# Patient Record
Sex: MALE | Race: BLACK OR AFRICAN AMERICAN | NOT HISPANIC OR LATINO
[De-identification: names, ages, dates, MRNs, and addresses within clinical notes are randomized per-mention and may not be internally consistent; named-entity substitution may affect disease eponyms.]

---

## 2017-04-06 ENCOUNTER — RECORDS - HEALTHEAST (OUTPATIENT)
Dept: ADMINISTRATIVE | Facility: OTHER | Age: 42
End: 2017-04-06

## 2017-10-17 ENCOUNTER — OFFICE VISIT - HEALTHEAST (OUTPATIENT)
Dept: INTERNAL MEDICINE | Facility: CLINIC | Age: 42
End: 2017-10-17

## 2017-10-17 ENCOUNTER — COMMUNICATION - HEALTHEAST (OUTPATIENT)
Dept: INTERNAL MEDICINE | Facility: CLINIC | Age: 42
End: 2017-10-17

## 2017-10-17 DIAGNOSIS — F25.0 SCHIZOAFFECTIVE DISORDER, BIPOLAR TYPE (H): ICD-10-CM

## 2017-10-17 DIAGNOSIS — M54.50 BILATERAL LOW BACK PAIN WITHOUT SCIATICA: ICD-10-CM

## 2017-10-17 DIAGNOSIS — F19.90 SUBSTANCE USE DISORDER: ICD-10-CM

## 2017-10-17 DIAGNOSIS — F60.9 PERSONALITY DISORDER (H): ICD-10-CM

## 2017-10-17 ASSESSMENT — MIFFLIN-ST. JEOR: SCORE: 1605.21

## 2017-10-27 ENCOUNTER — COMMUNICATION - HEALTHEAST (OUTPATIENT)
Dept: INTERNAL MEDICINE | Facility: CLINIC | Age: 42
End: 2017-10-27

## 2017-10-27 DIAGNOSIS — F25.1 SCHIZOAFFECTIVE DISORDER, DEPRESSIVE TYPE (H): ICD-10-CM

## 2017-11-03 ENCOUNTER — COMMUNICATION - HEALTHEAST (OUTPATIENT)
Dept: PHYSICAL MEDICINE AND REHAB | Facility: CLINIC | Age: 42
End: 2017-11-03

## 2017-11-16 ENCOUNTER — OFFICE VISIT - HEALTHEAST (OUTPATIENT)
Dept: INTERNAL MEDICINE | Facility: CLINIC | Age: 42
End: 2017-11-16

## 2017-11-16 DIAGNOSIS — F25.0 SCHIZOAFFECTIVE DISORDER, BIPOLAR TYPE (H): ICD-10-CM

## 2017-11-16 DIAGNOSIS — F19.20 POLYSUBSTANCE (EXCLUDING OPIOIDS) DEPENDENCE (H): ICD-10-CM

## 2017-11-16 DIAGNOSIS — M54.50 ACUTE BILATERAL LOW BACK PAIN WITHOUT SCIATICA: ICD-10-CM

## 2017-11-16 DIAGNOSIS — F19.90 SUBSTANCE USE DISORDER: ICD-10-CM

## 2017-11-16 DIAGNOSIS — G89.29 CHRONIC BACK PAIN: ICD-10-CM

## 2017-11-16 DIAGNOSIS — F99 CHRONIC MENTAL ILLNESS: ICD-10-CM

## 2017-11-16 DIAGNOSIS — M54.9 CHRONIC BACK PAIN: ICD-10-CM

## 2017-11-16 DIAGNOSIS — M25.511 RIGHT SHOULDER PAIN: ICD-10-CM

## 2017-11-16 ASSESSMENT — MIFFLIN-ST. JEOR: SCORE: 1582.53

## 2017-12-01 ENCOUNTER — COMMUNICATION - HEALTHEAST (OUTPATIENT)
Dept: INTERNAL MEDICINE | Facility: CLINIC | Age: 42
End: 2017-12-01

## 2017-12-14 ENCOUNTER — COMMUNICATION - HEALTHEAST (OUTPATIENT)
Dept: INTERNAL MEDICINE | Facility: CLINIC | Age: 42
End: 2017-12-14

## 2018-01-04 ENCOUNTER — COMMUNICATION - HEALTHEAST (OUTPATIENT)
Dept: INTERNAL MEDICINE | Facility: CLINIC | Age: 43
End: 2018-01-04

## 2018-01-05 ENCOUNTER — COMMUNICATION - HEALTHEAST (OUTPATIENT)
Dept: SCHEDULING | Facility: CLINIC | Age: 43
End: 2018-01-05

## 2018-01-30 ENCOUNTER — COMMUNICATION - HEALTHEAST (OUTPATIENT)
Dept: INTERNAL MEDICINE | Facility: CLINIC | Age: 43
End: 2018-01-30

## 2018-01-30 DIAGNOSIS — M54.50 BILATERAL LOW BACK PAIN WITHOUT SCIATICA: ICD-10-CM

## 2018-02-09 ENCOUNTER — COMMUNICATION - HEALTHEAST (OUTPATIENT)
Dept: INTERNAL MEDICINE | Facility: CLINIC | Age: 43
End: 2018-02-09

## 2018-03-26 ENCOUNTER — OFFICE VISIT - HEALTHEAST (OUTPATIENT)
Dept: INTERNAL MEDICINE | Facility: CLINIC | Age: 43
End: 2018-03-26

## 2018-03-26 DIAGNOSIS — F25.1 SCHIZOAFFECTIVE DISORDER, DEPRESSIVE TYPE (H): ICD-10-CM

## 2018-03-26 DIAGNOSIS — G89.29 CHRONIC BILATERAL LOW BACK PAIN WITHOUT SCIATICA: ICD-10-CM

## 2018-03-26 DIAGNOSIS — F41.9 ANXIETY: ICD-10-CM

## 2018-03-26 DIAGNOSIS — M54.50 CHRONIC BILATERAL LOW BACK PAIN WITHOUT SCIATICA: ICD-10-CM

## 2018-03-26 DIAGNOSIS — H40.9 GLAUCOMA: ICD-10-CM

## 2018-03-26 DIAGNOSIS — Z20.2 STD EXPOSURE: ICD-10-CM

## 2018-03-26 LAB — HIV 1+2 AB+HIV1 P24 AG SERPL QL IA: NEGATIVE

## 2018-03-26 ASSESSMENT — MIFFLIN-ST. JEOR: SCORE: 1609.74

## 2018-03-27 LAB
C TRACH DNA SPEC QL PROBE+SIG AMP: NEGATIVE
N GONORRHOEA DNA SPEC QL NAA+PROBE: NEGATIVE
T PALLIDUM AB SER QL: NEGATIVE

## 2018-03-28 ENCOUNTER — COMMUNICATION - HEALTHEAST (OUTPATIENT)
Dept: INTERNAL MEDICINE | Facility: CLINIC | Age: 43
End: 2018-03-28

## 2018-04-06 ENCOUNTER — COMMUNICATION - HEALTHEAST (OUTPATIENT)
Dept: INTERNAL MEDICINE | Facility: CLINIC | Age: 43
End: 2018-04-06

## 2018-04-10 ENCOUNTER — RECORDS - HEALTHEAST (OUTPATIENT)
Dept: ADMINISTRATIVE | Facility: OTHER | Age: 43
End: 2018-04-10

## 2018-04-26 ENCOUNTER — OFFICE VISIT - HEALTHEAST (OUTPATIENT)
Dept: INTERNAL MEDICINE | Facility: CLINIC | Age: 43
End: 2018-04-26

## 2018-04-26 DIAGNOSIS — F41.9 ANXIETY: ICD-10-CM

## 2018-04-26 DIAGNOSIS — G89.29 CHRONIC BILATERAL LOW BACK PAIN WITHOUT SCIATICA: ICD-10-CM

## 2018-04-26 DIAGNOSIS — F19.90 SUBSTANCE USE DISORDER: ICD-10-CM

## 2018-04-26 DIAGNOSIS — F25.1 SCHIZOAFFECTIVE DISORDER, DEPRESSIVE TYPE (H): ICD-10-CM

## 2018-04-26 DIAGNOSIS — M54.50 CHRONIC BILATERAL LOW BACK PAIN WITHOUT SCIATICA: ICD-10-CM

## 2018-04-26 ASSESSMENT — MIFFLIN-ST. JEOR: SCORE: 1682.32

## 2018-05-30 ENCOUNTER — OFFICE VISIT - HEALTHEAST (OUTPATIENT)
Dept: INTERNAL MEDICINE | Facility: CLINIC | Age: 43
End: 2018-05-30

## 2018-05-30 DIAGNOSIS — M54.50 BILATERAL LOW BACK PAIN WITHOUT SCIATICA: ICD-10-CM

## 2018-05-30 DIAGNOSIS — G89.29 CHRONIC BILATERAL LOW BACK PAIN WITHOUT SCIATICA: ICD-10-CM

## 2018-05-30 DIAGNOSIS — M54.50 CHRONIC BILATERAL LOW BACK PAIN WITHOUT SCIATICA: ICD-10-CM

## 2018-05-30 DIAGNOSIS — F60.9 PERSONALITY DISORDER (H): ICD-10-CM

## 2018-05-30 DIAGNOSIS — F25.1 SCHIZOAFFECTIVE DISORDER, DEPRESSIVE TYPE (H): ICD-10-CM

## 2018-05-30 ASSESSMENT — MIFFLIN-ST. JEOR: SCORE: 1655.1

## 2018-06-06 ENCOUNTER — RECORDS - HEALTHEAST (OUTPATIENT)
Dept: ADMINISTRATIVE | Facility: OTHER | Age: 43
End: 2018-06-06

## 2018-06-08 ENCOUNTER — COMMUNICATION - HEALTHEAST (OUTPATIENT)
Dept: INTERNAL MEDICINE | Facility: CLINIC | Age: 43
End: 2018-06-08

## 2018-06-20 ENCOUNTER — COMMUNICATION - HEALTHEAST (OUTPATIENT)
Dept: INTERNAL MEDICINE | Facility: CLINIC | Age: 43
End: 2018-06-20

## 2018-06-25 ENCOUNTER — COMMUNICATION - HEALTHEAST (OUTPATIENT)
Dept: INTERNAL MEDICINE | Facility: CLINIC | Age: 43
End: 2018-06-25

## 2018-06-26 ENCOUNTER — AMBULATORY - HEALTHEAST (OUTPATIENT)
Dept: INTERNAL MEDICINE | Facility: CLINIC | Age: 43
End: 2018-06-26

## 2018-06-27 ENCOUNTER — OFFICE VISIT - HEALTHEAST (OUTPATIENT)
Dept: INTERNAL MEDICINE | Facility: CLINIC | Age: 43
End: 2018-06-27

## 2018-06-27 ENCOUNTER — RECORDS - HEALTHEAST (OUTPATIENT)
Dept: ADMINISTRATIVE | Facility: OTHER | Age: 43
End: 2018-06-27

## 2018-06-27 DIAGNOSIS — G89.29 CHRONIC BACK PAIN: ICD-10-CM

## 2018-06-27 DIAGNOSIS — F25.1 SCHIZOAFFECTIVE DISORDER, DEPRESSIVE TYPE (H): ICD-10-CM

## 2018-06-27 DIAGNOSIS — F19.90 SUBSTANCE USE DISORDER: ICD-10-CM

## 2018-06-27 DIAGNOSIS — F41.9 ANXIETY: ICD-10-CM

## 2018-06-27 DIAGNOSIS — Z00.00 ROUTINE GENERAL MEDICAL EXAMINATION AT A HEALTH CARE FACILITY: ICD-10-CM

## 2018-06-27 DIAGNOSIS — Z20.2 POTENTIAL EXPOSURE TO STD: ICD-10-CM

## 2018-06-27 DIAGNOSIS — N48.89 PENILE CYST: ICD-10-CM

## 2018-06-27 DIAGNOSIS — M54.9 CHRONIC BACK PAIN: ICD-10-CM

## 2018-06-27 LAB
ALBUMIN SERPL-MCNC: 3.7 G/DL (ref 3.5–5)
ALBUMIN UR-MCNC: NEGATIVE MG/DL
ALP SERPL-CCNC: 36 U/L (ref 45–120)
ALT SERPL W P-5'-P-CCNC: 16 U/L (ref 0–45)
ANION GAP SERPL CALCULATED.3IONS-SCNC: 7 MMOL/L (ref 5–18)
APPEARANCE UR: CLEAR
AST SERPL W P-5'-P-CCNC: 16 U/L (ref 0–40)
BILIRUB DIRECT SERPL-MCNC: 0.1 MG/DL
BILIRUB SERPL-MCNC: 0.3 MG/DL (ref 0–1)
BILIRUB UR QL STRIP: NEGATIVE
BUN SERPL-MCNC: 9 MG/DL (ref 8–22)
CALCIUM SERPL-MCNC: 9.2 MG/DL (ref 8.5–10.5)
CHLORIDE BLD-SCNC: 109 MMOL/L (ref 98–107)
CHOLEST SERPL-MCNC: 163 MG/DL
CO2 SERPL-SCNC: 28 MMOL/L (ref 22–31)
COLOR UR AUTO: YELLOW
CREAT SERPL-MCNC: 0.83 MG/DL (ref 0.7–1.3)
ERYTHROCYTE [DISTWIDTH] IN BLOOD BY AUTOMATED COUNT: 11.5 % (ref 11–14.5)
FASTING STATUS PATIENT QL REPORTED: YES
GFR SERPL CREATININE-BSD FRML MDRD: >60 ML/MIN/1.73M2
GLUCOSE BLD-MCNC: 64 MG/DL (ref 70–125)
GLUCOSE UR STRIP-MCNC: NEGATIVE MG/DL
HCT VFR BLD AUTO: 42 % (ref 40–54)
HDLC SERPL-MCNC: 50 MG/DL
HGB BLD-MCNC: 13.8 G/DL (ref 14–18)
HGB UR QL STRIP: NEGATIVE
HIV 1+2 AB+HIV1 P24 AG SERPL QL IA: NEGATIVE
KETONES UR STRIP-MCNC: NEGATIVE MG/DL
LDLC SERPL CALC-MCNC: 97 MG/DL
LEUKOCYTE ESTERASE UR QL STRIP: NEGATIVE
MCH RBC QN AUTO: 31.6 PG (ref 27–34)
MCHC RBC AUTO-ENTMCNC: 32.9 G/DL (ref 32–36)
MCV RBC AUTO: 96 FL (ref 80–100)
NITRATE UR QL: NEGATIVE
PH UR STRIP: 6 [PH] (ref 5–8)
PLATELET # BLD AUTO: 337 THOU/UL (ref 140–440)
PMV BLD AUTO: 6.3 FL (ref 7–10)
POTASSIUM BLD-SCNC: 3.8 MMOL/L (ref 3.5–5)
PROT SERPL-MCNC: 6.6 G/DL (ref 6–8)
RBC # BLD AUTO: 4.38 MILL/UL (ref 4.4–6.2)
SODIUM SERPL-SCNC: 144 MMOL/L (ref 136–145)
SP GR UR STRIP: 1.02 (ref 1–1.03)
TRIGL SERPL-MCNC: 82 MG/DL
UROBILINOGEN UR STRIP-ACNC: NORMAL
WBC: 4.8 THOU/UL (ref 4–11)

## 2018-06-27 ASSESSMENT — MIFFLIN-ST. JEOR: SCORE: 1683.45

## 2018-07-02 ENCOUNTER — COMMUNICATION - HEALTHEAST (OUTPATIENT)
Dept: INTERNAL MEDICINE | Facility: CLINIC | Age: 43
End: 2018-07-02

## 2018-07-03 ENCOUNTER — COMMUNICATION - HEALTHEAST (OUTPATIENT)
Dept: INTERNAL MEDICINE | Facility: CLINIC | Age: 43
End: 2018-07-03

## 2018-07-11 ENCOUNTER — COMMUNICATION - HEALTHEAST (OUTPATIENT)
Dept: INTERNAL MEDICINE | Facility: CLINIC | Age: 43
End: 2018-07-11

## 2018-07-12 ENCOUNTER — RECORDS - HEALTHEAST (OUTPATIENT)
Dept: ADMINISTRATIVE | Facility: OTHER | Age: 43
End: 2018-07-12

## 2018-07-20 ENCOUNTER — RECORDS - HEALTHEAST (OUTPATIENT)
Dept: ADMINISTRATIVE | Facility: OTHER | Age: 43
End: 2018-07-20

## 2018-07-21 ENCOUNTER — RECORDS - HEALTHEAST (OUTPATIENT)
Dept: ADMINISTRATIVE | Facility: OTHER | Age: 43
End: 2018-07-21

## 2018-07-21 ENCOUNTER — COMMUNICATION - HEALTHEAST (OUTPATIENT)
Dept: SCHEDULING | Facility: CLINIC | Age: 43
End: 2018-07-21

## 2018-07-23 ENCOUNTER — COMMUNICATION - HEALTHEAST (OUTPATIENT)
Dept: BEHAVIORAL HEALTH | Facility: CLINIC | Age: 43
End: 2018-07-23

## 2018-07-24 ENCOUNTER — COMMUNICATION - HEALTHEAST (OUTPATIENT)
Dept: INTERNAL MEDICINE | Facility: CLINIC | Age: 43
End: 2018-07-24

## 2018-07-26 ENCOUNTER — OFFICE VISIT - HEALTHEAST (OUTPATIENT)
Dept: INTERNAL MEDICINE | Facility: CLINIC | Age: 43
End: 2018-07-26

## 2018-07-26 ENCOUNTER — COMMUNICATION - HEALTHEAST (OUTPATIENT)
Dept: INTERNAL MEDICINE | Facility: CLINIC | Age: 43
End: 2018-07-26

## 2018-07-26 DIAGNOSIS — F25.1 SCHIZOAFFECTIVE DISORDER, DEPRESSIVE TYPE (H): ICD-10-CM

## 2018-07-26 DIAGNOSIS — S09.93XS DENTAL INJURY, SEQUELA: ICD-10-CM

## 2018-07-26 DIAGNOSIS — K08.89 PAIN, DENTAL: ICD-10-CM

## 2018-07-26 ASSESSMENT — MIFFLIN-ST. JEOR: SCORE: 1677.78

## 2018-07-30 ENCOUNTER — COMMUNICATION - HEALTHEAST (OUTPATIENT)
Dept: INTERNAL MEDICINE | Facility: CLINIC | Age: 43
End: 2018-07-30

## 2018-08-01 ENCOUNTER — COMMUNICATION - HEALTHEAST (OUTPATIENT)
Dept: INTERNAL MEDICINE | Facility: CLINIC | Age: 43
End: 2018-08-01

## 2018-08-28 ENCOUNTER — COMMUNICATION - HEALTHEAST (OUTPATIENT)
Dept: INTERNAL MEDICINE | Facility: CLINIC | Age: 43
End: 2018-08-28

## 2018-08-28 DIAGNOSIS — F25.1 SCHIZOAFFECTIVE DISORDER, DEPRESSIVE TYPE (H): ICD-10-CM

## 2018-09-24 ENCOUNTER — RECORDS - HEALTHEAST (OUTPATIENT)
Dept: ADMINISTRATIVE | Facility: OTHER | Age: 43
End: 2018-09-24

## 2018-11-23 ENCOUNTER — RECORDS - HEALTHEAST (OUTPATIENT)
Dept: ADMINISTRATIVE | Facility: OTHER | Age: 43
End: 2018-11-23

## 2019-01-09 ENCOUNTER — RECORDS - HEALTHEAST (OUTPATIENT)
Dept: GENERAL RADIOLOGY | Facility: CLINIC | Age: 44
End: 2019-01-09

## 2019-01-09 ENCOUNTER — OFFICE VISIT - HEALTHEAST (OUTPATIENT)
Dept: INTERNAL MEDICINE | Facility: CLINIC | Age: 44
End: 2019-01-09

## 2019-01-09 DIAGNOSIS — F25.1 SCHIZOAFFECTIVE DISORDER, DEPRESSIVE TYPE (H): ICD-10-CM

## 2019-01-09 DIAGNOSIS — M79.645 THUMB PAIN, LEFT: ICD-10-CM
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
## 2019-01-09 DIAGNOSIS — M79.644 PAIN IN RIGHT FINGER(S): ICD-10-CM
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
## 2019-01-09 DIAGNOSIS — F41.9 ANXIETY: ICD-10-CM

## 2019-01-09 RX ORDER — RISPERIDONE 3 MG/1
3 TABLET ORAL AT BEDTIME
Qty: 30 TABLET | Refills: 1 | Status: SHIPPED | OUTPATIENT
Start: 2019-01-09

## 2019-01-09 ASSESSMENT — MIFFLIN-ST. JEOR: SCORE: 1627.89

## 2019-01-11 ENCOUNTER — RECORDS - HEALTHEAST (OUTPATIENT)
Dept: ADMINISTRATIVE | Facility: OTHER | Age: 44
End: 2019-01-11

## 2019-01-29 ENCOUNTER — RECORDS - HEALTHEAST (OUTPATIENT)
Dept: ADMINISTRATIVE | Facility: OTHER | Age: 44
End: 2019-01-29

## 2019-01-29 ENCOUNTER — OFFICE VISIT - HEALTHEAST (OUTPATIENT)
Dept: INTERNAL MEDICINE | Facility: CLINIC | Age: 44
End: 2019-01-29

## 2019-01-29 DIAGNOSIS — M79.645 THUMB PAIN, LEFT: ICD-10-CM

## 2019-01-29 ASSESSMENT — MIFFLIN-ST. JEOR: SCORE: 1609.74

## 2019-03-05 ENCOUNTER — RECORDS - HEALTHEAST (OUTPATIENT)
Dept: ADMINISTRATIVE | Facility: OTHER | Age: 44
End: 2019-03-05

## 2019-03-15 ENCOUNTER — OFFICE VISIT - HEALTHEAST (OUTPATIENT)
Dept: INTERNAL MEDICINE | Facility: CLINIC | Age: 44
End: 2019-03-15

## 2019-03-15 DIAGNOSIS — F60.9 PERSONALITY DISORDER (H): ICD-10-CM

## 2019-03-15 DIAGNOSIS — M79.645 PAIN OF FINGER OF LEFT HAND: ICD-10-CM

## 2019-03-15 DIAGNOSIS — G47.01 INSOMNIA DUE TO MEDICAL CONDITION: ICD-10-CM

## 2019-03-15 DIAGNOSIS — E55.9 VITAMIN D DEFICIENCY: ICD-10-CM

## 2019-03-15 ASSESSMENT — MIFFLIN-ST. JEOR: SCORE: 1618.81

## 2019-03-18 ENCOUNTER — RECORDS - HEALTHEAST (OUTPATIENT)
Dept: ADMINISTRATIVE | Facility: OTHER | Age: 44
End: 2019-03-18

## 2019-03-20 ENCOUNTER — AMBULATORY - HEALTHEAST (OUTPATIENT)
Dept: INTERNAL MEDICINE | Facility: CLINIC | Age: 44
End: 2019-03-20

## 2019-03-20 ENCOUNTER — COMMUNICATION - HEALTHEAST (OUTPATIENT)
Dept: INTERNAL MEDICINE | Facility: CLINIC | Age: 44
End: 2019-03-20

## 2019-03-20 DIAGNOSIS — M25.539 WRIST PAIN: ICD-10-CM

## 2019-03-20 DIAGNOSIS — M54.6 THORACIC BACK PAIN: ICD-10-CM

## 2019-03-20 DIAGNOSIS — M54.9 CHRONIC BACK PAIN: ICD-10-CM

## 2019-03-20 DIAGNOSIS — G89.29 CHRONIC BACK PAIN: ICD-10-CM

## 2019-04-20 ENCOUNTER — COMMUNICATION - HEALTHEAST (OUTPATIENT)
Dept: INTERNAL MEDICINE | Facility: CLINIC | Age: 44
End: 2019-04-20

## 2019-04-20 DIAGNOSIS — G47.01 INSOMNIA DUE TO MEDICAL CONDITION: ICD-10-CM

## 2019-07-15 ENCOUNTER — COMMUNICATION - HEALTHEAST (OUTPATIENT)
Dept: INTERNAL MEDICINE | Facility: CLINIC | Age: 44
End: 2019-07-15

## 2019-07-18 ENCOUNTER — COMMUNICATION - HEALTHEAST (OUTPATIENT)
Dept: SCHEDULING | Facility: CLINIC | Age: 44
End: 2019-07-18

## 2019-07-23 ENCOUNTER — OFFICE VISIT - HEALTHEAST (OUTPATIENT)
Dept: INTERNAL MEDICINE | Facility: CLINIC | Age: 44
End: 2019-07-23

## 2019-07-23 DIAGNOSIS — S31.30XA OPEN WOUND OF SCROTUM, INITIAL ENCOUNTER: ICD-10-CM

## 2019-07-23 DIAGNOSIS — F25.1 SCHIZOAFFECTIVE DISORDER, DEPRESSIVE TYPE (H): ICD-10-CM

## 2019-07-23 ASSESSMENT — MIFFLIN-ST. JEOR: SCORE: 1591.6

## 2019-07-24 ENCOUNTER — COMMUNICATION - HEALTHEAST (OUTPATIENT)
Dept: INTERNAL MEDICINE | Facility: CLINIC | Age: 44
End: 2019-07-24

## 2019-08-19 ENCOUNTER — RECORDS - HEALTHEAST (OUTPATIENT)
Dept: ADMINISTRATIVE | Facility: OTHER | Age: 44
End: 2019-08-19

## 2019-08-19 ENCOUNTER — AMBULATORY - HEALTHEAST (OUTPATIENT)
Dept: INTERNAL MEDICINE | Facility: CLINIC | Age: 44
End: 2019-08-19

## 2019-08-19 ENCOUNTER — COMMUNICATION - HEALTHEAST (OUTPATIENT)
Dept: INTERNAL MEDICINE | Facility: CLINIC | Age: 44
End: 2019-08-19

## 2019-08-19 DIAGNOSIS — K43.9 VENTRAL HERNIA: ICD-10-CM

## 2019-08-22 ENCOUNTER — COMMUNICATION - HEALTHEAST (OUTPATIENT)
Dept: INTERNAL MEDICINE | Facility: CLINIC | Age: 44
End: 2019-08-22

## 2019-08-22 ENCOUNTER — OFFICE VISIT - HEALTHEAST (OUTPATIENT)
Dept: SURGERY | Facility: CLINIC | Age: 44
End: 2019-08-22

## 2019-08-22 ENCOUNTER — SURGERY - HEALTHEAST (OUTPATIENT)
Dept: SURGERY | Facility: CLINIC | Age: 44
End: 2019-08-22

## 2019-08-22 DIAGNOSIS — K43.9 VENTRAL HERNIA WITHOUT OBSTRUCTION OR GANGRENE: ICD-10-CM

## 2019-08-22 ASSESSMENT — MIFFLIN-ST. JEOR: SCORE: 1596.13

## 2019-08-23 ENCOUNTER — RECORDS - HEALTHEAST (OUTPATIENT)
Dept: ADMINISTRATIVE | Facility: OTHER | Age: 44
End: 2019-08-23

## 2019-08-27 ENCOUNTER — OFFICE VISIT - HEALTHEAST (OUTPATIENT)
Dept: INTERNAL MEDICINE | Facility: CLINIC | Age: 44
End: 2019-08-27

## 2019-08-27 ENCOUNTER — COMMUNICATION - HEALTHEAST (OUTPATIENT)
Dept: SCHEDULING | Facility: CLINIC | Age: 44
End: 2019-08-27

## 2019-08-27 DIAGNOSIS — Z01.818 PREOPERATIVE EXAMINATION: ICD-10-CM

## 2019-08-27 DIAGNOSIS — G89.29 CHRONIC BILATERAL LOW BACK PAIN WITHOUT SCIATICA: ICD-10-CM

## 2019-08-27 DIAGNOSIS — M54.50 CHRONIC BILATERAL LOW BACK PAIN WITHOUT SCIATICA: ICD-10-CM

## 2019-08-27 DIAGNOSIS — F25.1 SCHIZOAFFECTIVE DISORDER, DEPRESSIVE TYPE (H): ICD-10-CM

## 2019-08-27 DIAGNOSIS — K43.9 VENTRAL HERNIA WITHOUT OBSTRUCTION OR GANGRENE: ICD-10-CM

## 2019-08-27 LAB
ERYTHROCYTE [DISTWIDTH] IN BLOOD BY AUTOMATED COUNT: 12.5 % (ref 11–14.5)
HCT VFR BLD AUTO: 41.7 % (ref 40–54)
HGB BLD-MCNC: 13.8 G/DL (ref 14–18)
MCH RBC QN AUTO: 30.7 PG (ref 27–34)
MCHC RBC AUTO-ENTMCNC: 33 G/DL (ref 32–36)
MCV RBC AUTO: 93 FL (ref 80–100)
PLATELET # BLD AUTO: 324 THOU/UL (ref 140–440)
PMV BLD AUTO: 6.7 FL (ref 7–10)
RBC # BLD AUTO: 4.48 MILL/UL (ref 4.4–6.2)
WBC: 5.3 THOU/UL (ref 4–11)

## 2019-08-27 RX ORDER — GABAPENTIN 300 MG/1
900 CAPSULE ORAL 3 TIMES DAILY
Qty: 270 CAPSULE | Refills: 3 | Status: SHIPPED | OUTPATIENT
Start: 2019-08-27

## 2019-08-27 ASSESSMENT — MIFFLIN-ST. JEOR: SCORE: 1554.17

## 2019-08-28 ENCOUNTER — ANESTHESIA - HEALTHEAST (OUTPATIENT)
Dept: SURGERY | Facility: AMBULATORY SURGERY CENTER | Age: 44
End: 2019-08-28

## 2019-08-28 LAB
ANION GAP SERPL CALCULATED.3IONS-SCNC: 9 MMOL/L (ref 5–18)
BUN SERPL-MCNC: 13 MG/DL (ref 8–22)
CALCIUM SERPL-MCNC: 9.7 MG/DL (ref 8.5–10.5)
CHLORIDE BLD-SCNC: 105 MMOL/L (ref 98–107)
CO2 SERPL-SCNC: 26 MMOL/L (ref 22–31)
CREAT SERPL-MCNC: 0.84 MG/DL (ref 0.7–1.3)
GFR SERPL CREATININE-BSD FRML MDRD: >60 ML/MIN/1.73M2
GLUCOSE BLD-MCNC: 66 MG/DL (ref 70–125)
POTASSIUM BLD-SCNC: 4 MMOL/L (ref 3.5–5)
SODIUM SERPL-SCNC: 140 MMOL/L (ref 136–145)

## 2019-08-29 ASSESSMENT — MIFFLIN-ST. JEOR
SCORE: 1546.24
SCORE: 1546.24

## 2019-08-30 ENCOUNTER — ANESTHESIA - HEALTHEAST (OUTPATIENT)
Dept: SURGERY | Facility: AMBULATORY SURGERY CENTER | Age: 44
End: 2019-08-30

## 2019-09-03 ENCOUNTER — COMMUNICATION - HEALTHEAST (OUTPATIENT)
Dept: SURGERY | Facility: CLINIC | Age: 44
End: 2019-09-03

## 2019-09-03 ENCOUNTER — SURGERY - HEALTHEAST (OUTPATIENT)
Dept: SURGERY | Facility: AMBULATORY SURGERY CENTER | Age: 44
End: 2019-09-03

## 2019-09-03 ENCOUNTER — HOSPITAL ENCOUNTER (OUTPATIENT)
Dept: SURGERY | Facility: AMBULATORY SURGERY CENTER | Age: 44
Discharge: HOME | End: 2019-09-03
Attending: SURGERY | Admitting: SURGERY

## 2019-09-03 DIAGNOSIS — K43.9 VENTRAL HERNIA WITHOUT OBSTRUCTION OR GANGRENE: ICD-10-CM

## 2019-09-03 ASSESSMENT — MIFFLIN-ST. JEOR
SCORE: 1546.24
SCORE: 1546.24

## 2019-09-04 ENCOUNTER — COMMUNICATION - HEALTHEAST (OUTPATIENT)
Dept: SURGERY | Facility: CLINIC | Age: 44
End: 2019-09-04

## 2019-09-09 ENCOUNTER — COMMUNICATION - HEALTHEAST (OUTPATIENT)
Dept: SCHEDULING | Facility: CLINIC | Age: 44
End: 2019-09-09

## 2019-09-09 ENCOUNTER — OFFICE VISIT - HEALTHEAST (OUTPATIENT)
Dept: INTERNAL MEDICINE | Facility: CLINIC | Age: 44
End: 2019-09-09

## 2019-09-09 DIAGNOSIS — F60.2 ANTISOCIAL PERSONALITY DISORDER IN ADULT (H): ICD-10-CM

## 2019-09-09 DIAGNOSIS — K43.9 VENTRAL HERNIA WITHOUT OBSTRUCTION OR GANGRENE: ICD-10-CM

## 2019-09-09 DIAGNOSIS — F25.1 SCHIZOAFFECTIVE DISORDER, DEPRESSIVE TYPE (H): ICD-10-CM

## 2019-09-09 ASSESSMENT — MIFFLIN-ST. JEOR: SCORE: 1564.38

## 2019-09-17 ENCOUNTER — RECORDS - HEALTHEAST (OUTPATIENT)
Dept: ADMINISTRATIVE | Facility: OTHER | Age: 44
End: 2019-09-17

## 2019-09-19 ENCOUNTER — COMMUNICATION - HEALTHEAST (OUTPATIENT)
Dept: SCHEDULING | Facility: CLINIC | Age: 44
End: 2019-09-19

## 2019-09-20 ENCOUNTER — COMMUNICATION - HEALTHEAST (OUTPATIENT)
Dept: INTERNAL MEDICINE | Facility: CLINIC | Age: 44
End: 2019-09-20

## 2019-09-20 DIAGNOSIS — G89.29 CHRONIC BILATERAL LOW BACK PAIN WITHOUT SCIATICA: ICD-10-CM

## 2019-09-20 DIAGNOSIS — M54.50 CHRONIC BILATERAL LOW BACK PAIN WITHOUT SCIATICA: ICD-10-CM

## 2019-09-23 ENCOUNTER — COMMUNICATION - HEALTHEAST (OUTPATIENT)
Dept: SURGERY | Facility: CLINIC | Age: 44
End: 2019-09-23

## 2019-09-24 ENCOUNTER — COMMUNICATION - HEALTHEAST (OUTPATIENT)
Dept: SCHEDULING | Facility: CLINIC | Age: 44
End: 2019-09-24

## 2019-09-30 ENCOUNTER — COMMUNICATION - HEALTHEAST (OUTPATIENT)
Dept: SCHEDULING | Facility: CLINIC | Age: 44
End: 2019-09-30

## 2019-10-14 ENCOUNTER — OFFICE VISIT - HEALTHEAST (OUTPATIENT)
Dept: INTERNAL MEDICINE | Facility: CLINIC | Age: 44
End: 2019-10-14

## 2019-10-14 ENCOUNTER — COMMUNICATION - HEALTHEAST (OUTPATIENT)
Dept: INTERNAL MEDICINE | Facility: CLINIC | Age: 44
End: 2019-10-14

## 2019-10-14 DIAGNOSIS — F99 CHRONIC MENTAL ILLNESS: ICD-10-CM

## 2019-10-14 DIAGNOSIS — G89.29 CHRONIC BILATERAL LOW BACK PAIN WITHOUT SCIATICA: ICD-10-CM

## 2019-10-14 DIAGNOSIS — M54.50 CHRONIC BILATERAL LOW BACK PAIN WITHOUT SCIATICA: ICD-10-CM

## 2019-10-14 DIAGNOSIS — F25.1 SCHIZOAFFECTIVE DISORDER, DEPRESSIVE TYPE (H): ICD-10-CM

## 2019-10-14 DIAGNOSIS — G47.01 INSOMNIA DUE TO MEDICAL CONDITION: ICD-10-CM

## 2019-10-14 ASSESSMENT — MIFFLIN-ST. JEOR: SCORE: 1568.92

## 2019-10-28 ENCOUNTER — COMMUNICATION - HEALTHEAST (OUTPATIENT)
Dept: INTERNAL MEDICINE | Facility: CLINIC | Age: 44
End: 2019-10-28

## 2019-10-28 DIAGNOSIS — F25.1 SCHIZOAFFECTIVE DISORDER, DEPRESSIVE TYPE (H): ICD-10-CM

## 2019-10-28 DIAGNOSIS — M54.50 CHRONIC BILATERAL LOW BACK PAIN WITHOUT SCIATICA: ICD-10-CM

## 2019-10-28 DIAGNOSIS — G89.29 CHRONIC BILATERAL LOW BACK PAIN WITHOUT SCIATICA: ICD-10-CM

## 2019-10-28 RX ORDER — HYDROXYZINE PAMOATE 100 MG
100 CAPSULE ORAL DAILY
Qty: 90 CAPSULE | Refills: 3 | Status: SHIPPED | OUTPATIENT
Start: 2019-10-28

## 2019-10-28 RX ORDER — TIZANIDINE 2 MG/1
4 TABLET ORAL EVERY 8 HOURS PRN
Qty: 30 TABLET | Refills: 1 | Status: SHIPPED | OUTPATIENT
Start: 2019-10-28

## 2019-10-29 ENCOUNTER — OFFICE VISIT - HEALTHEAST (OUTPATIENT)
Dept: INTERNAL MEDICINE | Facility: CLINIC | Age: 44
End: 2019-10-29

## 2019-10-29 DIAGNOSIS — G89.29 CHRONIC PAIN OF LEFT THUMB: ICD-10-CM

## 2019-10-29 DIAGNOSIS — M79.645 CHRONIC PAIN OF LEFT THUMB: ICD-10-CM

## 2019-10-29 DIAGNOSIS — G47.01 INSOMNIA DUE TO MEDICAL CONDITION: ICD-10-CM

## 2019-10-29 DIAGNOSIS — M79.672 LEFT FOOT PAIN: ICD-10-CM

## 2019-10-29 RX ORDER — ACETAMINOPHEN AND CODEINE PHOSPHATE 300; 30 MG/1; MG/1
1 TABLET ORAL EVERY 6 HOURS PRN
Qty: 20 TABLET | Refills: 0 | Status: SHIPPED | OUTPATIENT
Start: 2019-10-29

## 2019-10-29 RX ORDER — GINSENG 100 MG
CAPSULE ORAL 2 TIMES DAILY
Qty: 30 G | Refills: 3 | Status: SHIPPED | OUTPATIENT
Start: 2019-10-29

## 2019-10-29 RX ORDER — CLONAZEPAM 1 MG/1
1 TABLET ORAL
Qty: 30 TABLET | Refills: 0 | Status: SHIPPED | OUTPATIENT
Start: 2019-10-29

## 2019-10-29 ASSESSMENT — MIFFLIN-ST. JEOR: SCORE: 1555.31

## 2019-10-30 ENCOUNTER — RECORDS - HEALTHEAST (OUTPATIENT)
Dept: ADMINISTRATIVE | Facility: OTHER | Age: 44
End: 2019-10-30

## 2019-11-04 RX ORDER — BACITRACIN ZINC 500 [USP'U]/G
OINTMENT TOPICAL
Refills: 3 | Status: SHIPPED | COMMUNITY
Start: 2019-10-29

## 2019-11-07 ENCOUNTER — OFFICE VISIT - HEALTHEAST (OUTPATIENT)
Dept: PODIATRY | Facility: CLINIC | Age: 44
End: 2019-11-07

## 2019-11-07 DIAGNOSIS — L60.0 INGROWN TOENAIL: ICD-10-CM

## 2019-11-07 ASSESSMENT — MIFFLIN-ST. JEOR: SCORE: 1591.6

## 2019-11-08 ENCOUNTER — COMMUNICATION - HEALTHEAST (OUTPATIENT)
Dept: INTERNAL MEDICINE | Facility: CLINIC | Age: 44
End: 2019-11-08

## 2019-11-19 ENCOUNTER — COMMUNICATION - HEALTHEAST (OUTPATIENT)
Dept: SCHEDULING | Facility: CLINIC | Age: 44
End: 2019-11-19

## 2019-11-20 ENCOUNTER — COMMUNICATION - HEALTHEAST (OUTPATIENT)
Dept: INTERNAL MEDICINE | Facility: CLINIC | Age: 44
End: 2019-11-20

## 2019-11-20 ENCOUNTER — COMMUNICATION - HEALTHEAST (OUTPATIENT)
Dept: SCHEDULING | Facility: CLINIC | Age: 44
End: 2019-11-20

## 2019-11-20 DIAGNOSIS — L03.032 PARONYCHIA OF TOE, LEFT: ICD-10-CM

## 2019-11-20 DIAGNOSIS — L03.031 PARONYCHIA OF TOE, RIGHT: ICD-10-CM

## 2019-11-21 ENCOUNTER — COMMUNICATION - HEALTHEAST (OUTPATIENT)
Dept: SCHEDULING | Facility: CLINIC | Age: 44
End: 2019-11-21

## 2019-11-21 ENCOUNTER — COMMUNICATION - HEALTHEAST (OUTPATIENT)
Dept: INTERNAL MEDICINE | Facility: CLINIC | Age: 44
End: 2019-11-21

## 2019-11-21 DIAGNOSIS — L03.032 PARONYCHIA OF TOE, LEFT: ICD-10-CM

## 2019-11-21 DIAGNOSIS — L03.031 PARONYCHIA OF TOE, RIGHT: ICD-10-CM

## 2019-11-22 ENCOUNTER — COMMUNICATION - HEALTHEAST (OUTPATIENT)
Dept: INTERNAL MEDICINE | Facility: CLINIC | Age: 44
End: 2019-11-22

## 2019-12-20 ENCOUNTER — AMBULATORY - HEALTHEAST (OUTPATIENT)
Dept: PALLIATIVE MEDICINE | Facility: OTHER | Age: 44
End: 2019-12-20

## 2019-12-20 ENCOUNTER — HOSPITAL ENCOUNTER (OUTPATIENT)
Dept: PALLIATIVE MEDICINE | Facility: OTHER | Age: 44
Discharge: HOME | End: 2019-12-20
Attending: EMERGENCY MEDICINE

## 2019-12-20 ENCOUNTER — RECORDS - HEALTHEAST (OUTPATIENT)
Dept: ADMINISTRATIVE | Facility: OTHER | Age: 44
End: 2019-12-20

## 2019-12-20 DIAGNOSIS — G89.4 CHRONIC PAIN SYNDROME: ICD-10-CM

## 2020-01-16 ENCOUNTER — COMMUNICATION - HEALTHEAST (OUTPATIENT)
Dept: SURGERY | Facility: CLINIC | Age: 45
End: 2020-01-16

## 2021-05-28 NOTE — TELEPHONE ENCOUNTER
Refill Approved    Rx renewed per Medication Renewal Policy. Medication was last renewed on 3/15/19.    Sofie Cochran, [facility] Triage/Med Refill 4/23/2019     Requested Prescriptions   Pending Prescriptions Disp Refills     traZODone (DESYREL) 50 MG tablet [Pharmacy Med Name: TRAZODONE 50MG TABLETS] 30 tablet 0     Sig: TAKE 1 TABLET(50 MG) BY MOUTH AT BEDTIME       Tricyclics/Misc Antidepressant/Antianxiety Meds Refill Protocol Passed - 4/20/2019 12:04 PM        Passed - PCP or prescribing provider visit in last year     Last office visit with prescriber/PCP: 3/15/2019 Arnaldo Gan MD OR same dept: 3/15/2019 Arnaldo Gan MD OR same specialty: 3/15/2019 Arnaldo Gan MD  Last physical: 6/27/2018 Last MTM visit: Visit date not found   Next visit within 3 mo: Visit date not found  Next physical within 3 mo: Visit date not found  Prescriber OR PCP: Arnaldo Gan MD  Last diagnosis associated with med order: 1. Insomnia due to medical condition  - traZODone (DESYREL) 50 MG tablet [Pharmacy Med Name: TRAZODONE 50MG TABLETS]; TAKE 1 TABLET(50 MG) BY MOUTH AT BEDTIME  Dispense: 30 tablet; Refill: 0    If protocol passes may refill for 12 months if within 3 months of last provider visit (or a total of 15 months).

## 2021-05-30 NOTE — TELEPHONE ENCOUNTER
Scheduled patient this afternoon, he did question why he had to come again tomorrow and I advised to discuss with Pau at his appointment.  Ria Monaco CSS

## 2021-05-30 NOTE — TELEPHONE ENCOUNTER
This patient belongs in the emergency room.  He needs to be seen today there.  You can check with scheduling but I do not think there is any reason that we cannot send the patient was restricted to the ER.

## 2021-05-30 NOTE — TELEPHONE ENCOUNTER
Writer is able to provide wound care until patient is able to get into the wound clinic.  Based on when it was last cleaned and dressed, patient should come into clinic today and then again on Friday.  Writer is available after lunch today and please schedule again for Friday afternoon as well.      Please contact patient and set up these appointments and place on CSS nurse only schedule with notes: wound care - Pau to do.    Thank you!    Pau HIGHTOWER RN Clinical Supervisor...................8:18 AM
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
## 2021-05-30 NOTE — TELEPHONE ENCOUNTER
Can we make an appointment for him to be seen at the wound clinic earlier than September. In the meantime, can we ask him to come 3 times a week to the clinic to have his wound cleaned and dressed by Pau.

## 2021-05-30 NOTE — TELEPHONE ENCOUNTER
New Appointment Needed  What is the reason for the visit:    BACK ISSUES AND THUMB OCCASSIONALLY GOES NUMB/ MED REFILLS   Provider Preference: PCP only  How soon do you need to be seen?: today or tomorrow. :Patient stated it is urgent and that Dr. Gan is the only doctor that he could see because he is on a restricted plan.   Waitlist offered?: No  Okay to leave a detailed message:  Yes

## 2021-05-30 NOTE — TELEPHONE ENCOUNTER
Spoke with patient and offered him the 8:00 time available tomorrow. He states this is to early. Are you willing to fit in elsewhere?     Karolyn Gallagher, CMA

## 2021-05-30 NOTE — TELEPHONE ENCOUNTER
Called and informed patient to go to [facility] Ed. He stated he will go to the ED today and see Dr. Gan next week for a follow up.     Rosa M Kimball LPN

## 2021-05-30 NOTE — TELEPHONE ENCOUNTER
Who is calling:  Patient  Reason for Call:  Patient states he cannot get in to be seen for wound care (scrotum) until September 3rd, 2019.  Patient is questioning what to do before the appointment.  Patient states his pain is severe.  Date of last appointment with primary care: 7/24/19  Okay to leave a detailed message: Yes
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
## 2021-05-30 NOTE — TELEPHONE ENCOUNTER
There is nothing we can do to get patient in sooner. Dr. aGn can try to do a peer to peer call. Currently there is only 1 NP that is caring for our patients for wound care.p

## 2021-05-30 NOTE — TELEPHONE ENCOUNTER
Line went directly to voicemail. Left message informing patient of message from PCP. I have asked him to call back and schedule the next available that works for his schedule.    Karolyn Gallagher, CMA

## 2021-05-30 NOTE — TELEPHONE ENCOUNTER
Can we ask Pau if she is willing to clean and dress his wound 2 to 3 times a week while waiting his wound clinic appointment as a nurse visit.

## 2021-05-31 ENCOUNTER — RECORDS - HEALTHEAST (OUTPATIENT)
Dept: ADMINISTRATIVE | Facility: CLINIC | Age: 46
End: 2021-05-31

## 2021-05-31 VITALS — BODY MASS INDEX: 21 KG/M2 | HEIGHT: 71 IN | WEIGHT: 150 LBS

## 2021-05-31 VITALS — BODY MASS INDEX: 21.7 KG/M2 | HEIGHT: 71 IN | WEIGHT: 155 LBS

## 2021-05-31 NOTE — ANESTHESIA PREPROCEDURE EVALUATION
Anesthesia Evaluation      Patient summary reviewed   No history of anesthetic complications     Airway   Mallampati: I   Pulmonary - normal exam    breath sounds clear to auscultation  (+) a smoker  (-) pneumonia, COPD, asthma, shortness of breath, recent URI, sleep apnea                         Cardiovascular - negative ROS and normal exam  Rhythm: regular  Rate: normal,         Neuro/Psych    (+) depression, anxiety/panic attacks,     Endo/Other - negative ROS      GI/Hepatic/Renal - negative ROS      Other findings: Drug and ETOH abuse      Dental                         Anesthesia Plan  Planned anesthetic: MAC    ASA 2     Anesthetic plan and risks discussed with: patient    Post-op plan: routine recovery

## 2021-05-31 NOTE — ANESTHESIA POSTPROCEDURE EVALUATION
Patient: Kaushik Lopez  HERNIORRHAPHY, VENTRAL, OPEN with MESH  Anesthesia type: MAC    Patient location: phase 2  Last vitals:   Vitals Value Taken Time   /77 9/3/2019  9:30 AM   Temp  9/3/2019  9:39 AM   Pulse 55 9/3/2019  9:35 AM   Resp 16 9/3/2019  9:00 AM   SpO2 83 % 9/3/2019  9:35 AM   Vitals shown include unvalidated device data.  Post vital signs: stable  Level of consciousness: awake and responds to simple questions  Post-anesthesia pain: pain controlled  Post-anesthesia nausea and vomiting: no  Pulmonary: unassisted, return to baseline  Cardiovascular: stable and blood pressure at baseline  Hydration: adequate  Anesthetic events: no    QCDR Measures:  ASA# 11 - Ria-op Cardiac Arrest: ASA11B - Patient did NOT experience unanticipated cardiac arrest  ASA# 12 - Ria-op Mortality Rate: ASA12B - Patient did NOT die  ASA# 13 - PACU Re-Intubation Rate: ASA13B - Patient did NOT require a new airway mgmt  ASA# 10 - Composite Anes Safety: ASA10A - No serious adverse event    Additional Notes:

## 2021-05-31 NOTE — ANESTHESIA CARE TRANSFER NOTE
Last vitals:   Vitals:    09/03/19 0850   BP: 147/78   Pulse: 79   Resp: 16   Temp: 36.7  C (98  F)   SpO2: 100%     Patient's level of consciousness is drowsy  Spontaneous respirations: yes  Maintains airway independently: yes  Dentition unchanged: yes  Oropharynx: oropharynx clear of all foreign objects    QCDR Measures:  ASA# 20 - Surgical Safety Checklist: WHO surgical safety checklist completed prior to induction    PQRS# 430 - Adult PONV Prevention: 4558F - Pt received => 2 anti-emetic agents (different classes) preop & intraop  ASA# 8 - Peds PONV Prevention: NA - Not pediatric patient, not GA or 2 or more risk factors NOT present  PQRS# 424 - Ria-op Temp Management: 4559F - At least one body temp DOCUMENTED => 35.5C or 95.9F within required timeframe  PQRS# 426 - PACU Transfer Protocol: - Transfer of care checklist used  ASA# 14 - Acute Post-op Pain: ASA14B - Patient did NOT experience pain >= 7 out of 10

## 2021-05-31 NOTE — PROGRESS NOTES
Pt needs to fill rx @ [facility] on [address] in [place] [phone] Per patient.   Due to insurance restrictions/opioid care plan.  Dr Stanley called to call rx to above pharmacy.   Pt and SO informed.

## 2021-05-31 NOTE — OP NOTE
Operative Note:  Ventral hernia repair    Name:  Kaushik Lopez  PCP:  Arnaldo Gan MD  Procedure Date:  9/3/2019      OPEN VENTRAL HERNIA REPAIR WITH MESH      Pre-Procedure Diagnosis:  Ventral hernia     Post-Procedure Diagnosis:    Ventral hernia    Anesthesia Type:    MAC    Estimated Blood Loss:   2 cc    Specimens:    None       Complications:    None apparent    Indication for procedure:  This is a 44-year-old male who has been having periumbilical abdominal pain.  He was found to have a ventral hernia in this location.  Due to the symptoms, he has elected for operative intervention for further treatment.    Operative Report:    After informed consent was obtained, and the risks and benefits of the procedure were discussed, the patient was brought to the operating room and placed in the supine position.  MAC anesthesia was provided by the anesthesia department.  The abdomen was prepped and draped in the usual sterile manner.  The area was infiltrated with 0.25% Marcaine.  A curvilinear incision was placed just inferior to the umbilicus and carried sharply down to the fascia.  The overlying dermis of the umbilicus was dissected free of the hernia contents and was dissected free of the fascia.  There was noted to be a 1 centimeter defect.  The protruding preperitoneal fat was freed from the surrounding fascia and reduced.  The space between the peritoneum and fascia was undermined circumferentially and a small Ventralex ST hernia patch was used to repair the defect.  This was placed deep to the fascia with good overlap.  The mesh tails were secured to the fascia with interrupted 0 Ethibond.  The fascia was then approximated over the mesh using interrupted 0 Ethibond.  The dermis of the overlying umbilicus was then tacked back down to the fascia using 3-0 Vicryl.  The skin was closed with interrupted 4-0 Vicryl, followed by a running sub-cuticular 4-0 Monocryl.  A sterile dressing was then  placed.    Disposition:  The patient tolerated procedure well.  Sponge and needle counts reported as correct.  They were transferred to the postanesthesia care unit in stable condition.    Seema Stanley [facility]  [phone]

## 2021-05-31 NOTE — PROGRESS NOTES
History:  Kaushik Lopez is a 44 y.o. male who was referred for evaluation of a ventral hernia.  He presents with complaints of pain and swelling at the umbilical region.   He has been aware of this for the past few months.  Over time, it has been getting worse.  The pain is described as a burning.  It has waves of worsening which are associated with nausea.  He also feels like it is sticking out more over time.  Sometimes it will reduce on its own.  Sometimes he will reduce it himself.  He denies any nausea, vomiting, fevers, chills, bloody bowel movements or any other complaints at this time.  He specifically denies any obstipation, or bloating.       Allergies:  Ibuprofen; Iodinated contrast- oral and iv dye; Ioxaglate sodium; Latex; and Nsaids (non-steroidal anti-inflammatory drug)    Past medical history:  Chronic pain  Schizoaffective disorder  Bipolar disorder    Past surgical history:  None    Current medications:  He is currently not taking any of his prescription medications including Risperdal, Zanaflex, Neurontin, Thorazine, Lidoderm, or any prescription narcotics    Family history:  Denies medical problems in his parents including diabetes, heart disease, or anesthesia problems    Social History:  Reports that he has been smoking.  He has been smoking about 1.00 pack per 3 days. He has never used smokeless tobacco. He reports that he drinks alcohol occasionally. He reports that he has current or past drug history. Drug: Marijuana. Frequency: 7.00 times per week.    Review of Systems:   General: No complaints or constitutional symptoms  Skin: No complaints or symptoms   Hematologic/Lymphatic: No symptoms or complaints  Psychiatric: No symptoms or complaints  Endocrine: No excessive fatigue, no hypermetabolic symptoms reported  Respiratory: No cough, shortness of breath, or wheezing  Cardiovascular: No chest pain or dyspnea on exertion  Gastrointestinal: As per HPI  Musculoskeletal: No recent injuries  "reported  Neurological: No focal neurologic defects reported.      Exam:  BP 93/49   Pulse 60   Resp 18   Ht 5' 11\" (1.803 m)   Wt 153 lb (69.4 kg)   SpO2 97%   BMI 21.34 kg/m    Body mass index is 21.34 kg/m .  General : Alert, cooperative, appears stated age   Skin: Skin color, texture, turgor normal, no rashes or lesions   Lymphatic: No obvious adenopathy, no swelling   Eyes: No scleral icterus, pupils equal  HENT: No traumatic injury to the head or face, no gross abnormalities  Lungs: Normal respiratory effort, breath sounds equal bilaterally  Heart: Regular rate and rhythm  Abdomen: Soft, nondistended, nontender to palpation.  Small umbilical hernia that is too tender to allow for reduction, suspect it contains preperitoneal fat  Musculoskeletal: No obvious swelling  Neurologic: Grossly intact    Assessment/Plan:    Kaushik Lopez is a 44 y.o. male with an umbilical hernia.  The pathophysiology of umbilical hernias was discussed as were the surgical and non-operative management strategies.      We discussed both open and laparoscopic approaches, and the respective risks and benefits of each approach.  We similarly discussed the role and specific risks associated with mesh placement and primary repair.  The risks of surgery in general were discussed which include, but are not limited to, bleeding, infection, recurrent hernia, chronic pain, poor cosmesis, blood clots, stroke, heart attack and death.  His risks are increased because he is a smoker.  He is aware of this.  He was encouraged to quit.  Additionally, the risks of observation were discussed which include, but are not limited to, enlargement of the hernia, incarceration, strangulation, pain and death.      I think the best approach for his small umbilical hernia would be with an open repair at the outpatient surgery center.  Depending on how large the defect is intraoperatively, possibly mesh placement.  The postoperative recovery and restrictions " were discussed with the patient and his girlfriend.  On the day of surgery, a prescription for pain medication for a few days would be provided.  He understands everything that has been discussed.  All of his questions have been answered.  He would like to proceed with open ventral hernia repair.    Seema Stanley, DO  [facility]  [phone]

## 2021-05-31 NOTE — TELEPHONE ENCOUNTER
Notified Lynne that he is restricted to Dr. Gan and he is currently out of the office so he will need to manage pain with IBU/Tylenol  Ria Monaco CSS

## 2021-05-31 NOTE — TELEPHONE ENCOUNTER
Patient was seen on 8/17/19 at . LM for patient's care  with [facility] ([place] - [phone]). Patient is not active with the [employer] insurance.     [facility] didn't place and order for a surgical consult for hernia repair. Could a covering provider place the order?     Thank you.      Please forward this message back to  when order is placed so we can continue adding updates once managed care rep from  calls back.

## 2021-05-31 NOTE — TELEPHONE ENCOUNTER
Order has been placed for Dr. Stein to review per message below.  Sabine KHOURY, VIVIENNE/CMT....................1:48 PM

## 2021-05-31 NOTE — TELEPHONE ENCOUNTER
New Appointment Needed  What is the reason for the visit:  EDF; patient is in a lot of pain, and Emergency Department could not send home pain pills due to patient being a restricted patient, per patient.  -patient declined nurse triage, as he does not feel like talking.  Inpatient/ED Follow Up Appt Request  At what hospital or facility were you seen?: [facility]  What is the reason you were seen?: Abdominal pain, was told he has a hernia.   -Patient is still in a lot of pain.  What date were you admitted?: date: 08-17-19   What date were you discharged?: date: 08-17-19  What was the recommended timeframe for your follow up appointment?: right away  Provider Preference: PCP only   Patient is a Restricted patient. PCP is out of office.  Patient requesting that someone call his [employer] insurance and tell him that he needs to see the covering provider because the PCP is out of office.  How soon do you need to be seen?: today; as soon as possible  Waitlist offered?: No  Okay to leave a detailed message:  Yes    Called immediate need, told to send message due to restriction and PCP out of office.

## 2021-05-31 NOTE — PATIENT INSTRUCTIONS - HE
Hernia education & surgery packet provided to sariah Patrick [facility]  P: [phone]  F: [phone]

## 2021-05-31 NOTE — INTERVAL H&P NOTE
Patient seen in pre-op.  Denies new medical problems.  All questions answered regarding procedure.  Consent obtained.  To the OR for ventral hernia repair.    Seema Stanley, [facility]  [phone]

## 2021-05-31 NOTE — H&P (VIEW-ONLY)
History:  Kaushik Lopez is a 44 y.o. male who was referred for evaluation of a ventral hernia.  He presents with complaints of pain and swelling at the umbilical region.   He has been aware of this for the past few months.  Over time, it has been getting worse.  The pain is described as a burning.  It has waves of worsening which are associated with nausea.  He also feels like it is sticking out more over time.  Sometimes it will reduce on its own.  Sometimes he will reduce it himself.  He denies any nausea, vomiting, fevers, chills, bloody bowel movements or any other complaints at this time.  He specifically denies any obstipation, or bloating.       Allergies:  Ibuprofen; Iodinated contrast- oral and iv dye; Ioxaglate sodium; Latex; and Nsaids (non-steroidal anti-inflammatory drug)    Past medical history:  Chronic pain  Schizoaffective disorder  Bipolar disorder    Past surgical history:  None    Current medications:  He is currently not taking any of his prescription medications including Risperdal, Zanaflex, Neurontin, Thorazine, Lidoderm, or any prescription narcotics    Family history:  Denies medical problems in his parents including diabetes, heart disease, or anesthesia problems    Social History:  Reports that he has been smoking.  He has been smoking about 1.00 pack per 3 days. He has never used smokeless tobacco. He reports that he drinks alcohol occasionally. He reports that he has current or past drug history. Drug: Marijuana. Frequency: 7.00 times per week.    Review of Systems:   General: No complaints or constitutional symptoms  Skin: No complaints or symptoms   Hematologic/Lymphatic: No symptoms or complaints  Psychiatric: No symptoms or complaints  Endocrine: No excessive fatigue, no hypermetabolic symptoms reported  Respiratory: No cough, shortness of breath, or wheezing  Cardiovascular: No chest pain or dyspnea on exertion  Gastrointestinal: As per HPI  Musculoskeletal: No recent injuries  "reported  Neurological: No focal neurologic defects reported.      Exam:  BP 93/49   Pulse 60   Resp 18   Ht 5' 11\" (1.803 m)   Wt 153 lb (69.4 kg)   SpO2 97%   BMI 21.34 kg/m    Body mass index is 21.34 kg/m .  General : Alert, cooperative, appears stated age   Skin: Skin color, texture, turgor normal, no rashes or lesions   Lymphatic: No obvious adenopathy, no swelling   Eyes: No scleral icterus, pupils equal  HENT: No traumatic injury to the head or face, no gross abnormalities  Lungs: Normal respiratory effort, breath sounds equal bilaterally  Heart: Regular rate and rhythm  Abdomen: Soft, nondistended, nontender to palpation.  Small umbilical hernia that is too tender to allow for reduction, suspect it contains preperitoneal fat  Musculoskeletal: No obvious swelling  Neurologic: Grossly intact    Assessment/Plan:    Kaushik Lopez is a 44 y.o. male with an umbilical hernia.  The pathophysiology of umbilical hernias was discussed as were the surgical and non-operative management strategies.      We discussed both open and laparoscopic approaches, and the respective risks and benefits of each approach.  We similarly discussed the role and specific risks associated with mesh placement and primary repair.  The risks of surgery in general were discussed which include, but are not limited to, bleeding, infection, recurrent hernia, chronic pain, poor cosmesis, blood clots, stroke, heart attack and death.  His risks are increased because he is a smoker.  He is aware of this.  He was encouraged to quit.  Additionally, the risks of observation were discussed which include, but are not limited to, enlargement of the hernia, incarceration, strangulation, pain and death.      I think the best approach for his small umbilical hernia would be with an open repair at the outpatient surgery center.  Depending on how large the defect is intraoperatively, possibly mesh placement.  The postoperative recovery and restrictions " were discussed with the patient and his girlfriend.  On the day of surgery, a prescription for pain medication for a few days would be provided.  He understands everything that has been discussed.  All of his questions have been answered.  He would like to proceed with open ventral hernia repair.    Seema Stanley, DO  [facility]  [phone]

## 2021-05-31 NOTE — TELEPHONE ENCOUNTER
"RN Triage:     Patient is calling in stating he is having a umbilical hernia repair and having pain in abdomen. Patient thinks he has a fever \"sometimes\" no known temperature as he does not have thermometer. Patient stated the pain is a 8/10 at times. . Patient has a pre-op physical today. Patient stated the pain comes and goes and is not always a 8/10. Sometimes he feels fleeting nausea that comes and goes but no vomiting. Patient stated that he can all of a sudden feels sick. Patient stated that tylenol and advil does not help. Patient stated that he has an underlying back pain problem. He is asking for pain medication. Per patient he \"is a restricted patient\". He does not want to go to the ED  Ceci Dangelo RN, BSN [facility] Triage Nurse    Reason for Disposition    Patient wants to be seen    Protocols used: ABDOMINAL PAIN - MALE-A-OH      "

## 2021-05-31 NOTE — PROGRESS NOTES
Pt taken to main lobby with NA. Waiting for medical cab. Pt left wheelchair and NA  to go outside to smoke. Julia (s.o) with patient.

## 2021-05-31 NOTE — TELEPHONE ENCOUNTER
Upcoming Appointment Question  When is the appointment: 08/27/2019  What is your appointment for?: pre-op   Who is your appointment scheduled with?: Dr. Stanley with [facility]  What is your question/concern?: per Bozena, patient was seen today by Dr. Stanley and he is scheduled for surgery on 09/03/2019 for a open ventral hernia repair.  Bozena is wondering if pcp or one of his partners can prescribe some pain medication(s) for patient until he see's pcp for his  pre-op scheduled on 08/27/2019.   Okay to leave a detailed message?: Yes ok to call Bozena back with questions.  Otherwise call patient instead. Fax to pharmacy listed on file.

## 2021-05-31 NOTE — PROGRESS NOTES
Pt and family verbalize good understanding of discharge teach and follow up with MD.   VSS,Surgical incision CDI. D/C criteria met. Pt verbalizes readiness to go home. [facility] cab called per pt.    Juliet Juarez RN 9/3/2019 10:18 AM

## 2021-05-31 NOTE — TELEPHONE ENCOUNTER
PA done for pt's pain medication following his hernia repair this morning.  This medication was approved for 1 year.  Pharmacy called to let them know.      Eli Castanon RN, N  [facility] Bariat[facility]  P [phone]  F [phone]

## 2021-06-01 VITALS — BODY MASS INDEX: 21.84 KG/M2 | HEIGHT: 71 IN | WEIGHT: 156 LBS

## 2021-06-01 VITALS — BODY MASS INDEX: 23.24 KG/M2 | HEIGHT: 71 IN | WEIGHT: 166 LBS

## 2021-06-01 VITALS — HEIGHT: 71 IN | BODY MASS INDEX: 24.08 KG/M2 | WEIGHT: 172 LBS

## 2021-06-01 VITALS — WEIGHT: 174 LBS | BODY MASS INDEX: 24.36 KG/M2 | HEIGHT: 71 IN

## 2021-06-01 VITALS — HEIGHT: 71 IN | BODY MASS INDEX: 23.94 KG/M2 | WEIGHT: 171 LBS

## 2021-06-01 NOTE — TELEPHONE ENCOUNTER
Pt Kaushik Lopez [birth year] ph [phone] Pt just had surgery and is experiencing a lot of pain with taking pain med's as prescribed and ice packs

## 2021-06-01 NOTE — TELEPHONE ENCOUNTER
Medication Request  Medication name: cyclobenzaprine (FLEXERIL) 10 MG tablet [Pharmacy Med Name: CYCLOBENZAPRINE   Pharmacy Name and Location: [facility] on [address]  Reason for request: I am having back spasms with pain in to my neck.   When did you use medication last?:  Patient stated This was last filled in November.  Patient offered appointment:  yes and patient repeated several times my doctor is out until next week and I am a restricted patient. Patient was given walkin clinic informatin and stated I can only be [place] at the [place] location. Patient was transfered to scheduling.   Okay to leave a detailed message: yes

## 2021-06-01 NOTE — TELEPHONE ENCOUNTER
RN cannot approve Refill Request    RN can NOT refill this medication med is not covered by policy/route to provider and medication not on med list     . Last office visit: 9/9/2019 Arnaldo Gan MD Last Physical: 8/27/2019 Last MTM visit: Visit date not found Last visit same specialty: 9/9/2019 Arnaldo Gan MD.  Next visit within 3 mo: Visit date not found  Next physical within 3 mo: Visit date not found      Sofie Cochran, [facility] Triage/Med Refill 9/20/2019    Requested Prescriptions   Pending Prescriptions Disp Refills     cyclobenzaprine (FLEXERIL) 10 MG tablet [Pharmacy Med Name: CYCLOBENZAPRINE 10MG TABLETS] 15 tablet 0     Sig: TAKE 1 TABLET BY MOUTH THREE TIMES DAILY       There is no refill protocol information for this order

## 2021-06-01 NOTE — TELEPHONE ENCOUNTER
"Pt calls to report severe post-op pain following hernia repair 9/3/19.  Has depleted his Percocet prescribed by surgeon (Dr Stanley).  States \"Had to take two tabs every 4 hours (instead of q 6 hrs).\"  Cannot take ibuprofen due to GI history.  Hopes for add'l Rx for pain.  Pt agrees to clinic appt with Dr Gan to discuss pain relief.  Warm transferred to a  for this purpose now.    Maria Alejandra Barnett RN BSBA  [facility] RN Triage     Reason for Disposition    [1] MILD-MODERATE post-op pain (e.g., pain scale 1-7) AND [2] not controlled with pain medications    Protocols used: POST-OP SYMPTOMS AND [id number]      "
previous_biopsy_has_been_previously_biopsied
Body Location Override (Optional): Right superior helix

## 2021-06-01 NOTE — TELEPHONE ENCOUNTER
New Appointment Needed  What is the reason for the visit:    Same Date/Next Day Appt Request  What is the reason for your visit?: Muscle spasms    Provider Preference: Any available, patient is MA restricted.  How soon do you need to be seen?: today  Waitlist offered?: No  Okay to leave a detailed message:  Yes

## 2021-06-01 NOTE — TELEPHONE ENCOUNTER
Spoke with the patient and let him know that there are not available appointments for today, but he did schedule an appointment to see Dr. Gan on Tuesday at 1:20 pm.  He had no further questions at this time.  Sabine KHOURY CMA/RHONA....................12:00 PM

## 2021-06-01 NOTE — TELEPHONE ENCOUNTER
Called patient and relayed message below from covering Md. Advised he is prescribed Tizanidine which also can help muscle spasms and he should try that. He stated he does not have any at home and will contact the pharmacy for the refill.     Rosa M Kimball LPN

## 2021-06-01 NOTE — TELEPHONE ENCOUNTER
Pt states he had an appointment today and he wasn't able to be seen due to being 20 minutes late.   Pt states his PCP is booked all week.   Pt is requesting his muscle relaxer.   RN confirmed which medication.    RX was sent in yesterday for patient.     cyclobenzaprine (FLEXERIL) 10 MG tablet   Medication   Date: 9/23/2019 Department: [facility] Internal Medicine Ordering/Authorizing: Arnaldo Gan MD   Order Providers     Prescribing Provider Encounter Provider   Arnaldo Gan MD Beltran, Alfredo M, MD   Outpatient Medication Detail      Disp Refills Start End    cyclobenzaprine (FLEXERIL) 10 MG tablet 15 tablet 0 9/23/2019     Sig: TAKE 1 TABLET BY MOUTH THREE TIMES DAILY    Sent to pharmacy as: cyclobenzaprine 10 mg tablet (FLEXERIL)    E-Prescribing Status: Receipt confirmed by pharmacy (9/23/2019  7:38 AM CDT)         Pt states he is upset about the people in the office.   States they did not tell him about the rx being sent. Pt states they could have just told him. RN advised of need to schedule with Dr. Gan for follow up but that Dr. Gan did send in 15 tablets yesterday for his Flexeril.     Pt was advised to schedule follow up next week with Dr. Gan.      Pt stated understanding.  Amy Christine, RN   [facility] RN Triage      Reason for Disposition    Caller has medication question only, adult not sick, and triager answers question    Protocols used: MEDICATION QUESTION CALL-A-

## 2021-06-01 NOTE — TELEPHONE ENCOUNTER
Called pt back to let me know that  is not going to increase his pain medication dosage.  Encouraged him to continue doing the ice packs, resting and taking his pain meds.  Pt verbalized understanding.    Eli Castanon RN, [facility] and Bariatric Care  P [phone]  F [phone]

## 2021-06-01 NOTE — TELEPHONE ENCOUNTER
RN Triage:    S/P ventral hernia repair on 9/3/18    Has muscle spasms in upper back, left shoulder and neck.  Chronic condition; no precipitating injury.  Is out of flexeril and is requesting a refill.  Worked well in the past.  Home care discussed.  He thinks he is restricted to one physician.     Request:  Would covering physician refill flexeril?  [facility] on [address] in [place].  If covering physician would agree to prescribe flexeril, please call Kaushik's , Vladimir, at [phone] so that he can arrange it with the pharmacy.  Kaushik is awaiting a callback today.    Yohana Vasques, RN   [facility]

## 2021-06-01 NOTE — PROGRESS NOTES
Office Visit - Follow Up   Kaushik Lopez   44 y.o. male    Date of Visit: 9/9/2019    Chief Complaint   Patient presents with     Follow-up     had hernia surgery on 9/3, increased pain since surgery, has not changed dressing on abdomen. He took Percocet 2 tablets every 4 hours due to increased pain. Seeing surgeon next week.         Assessment and Plan   1. Ventral hernia without obstruction or gangrene, s/p repair, 9/3/19.  Had ventral hernia surgery on 9/3/2019.  No untoward complications.  Concerned because his   site of surgery, navel still is tender to touch.  Afraid  there is an infection.  Remove the dressing and site with surgery is clean without infection or inflammation.  Okay to continue Percocet as needed gave #20 without refill.    Clean and dressed his operative site.  No signs of infection.  Applied a big Band-Aid to cover the small operative site or wound.  Advised to replace Band-Aid only when it falls off.    - oxyCODONE-acetaminophen (PERCOCET/ENDOCET) 5-325 mg per tablet; Take 1-2 tablets by mouth every 6 (six) hours as needed for pain.  Dispense: 20 tablet; Refill: 0    2. Antisocial personality disorder in adult (H)  In remission.  Followed by psychiatry.  Takes risperidone, hydroxyzine tizanidine and trazodone.    3. Schizoaffective disorder, depressive type (H)  Controlled.  Followed by psychiatry.  Takes risperidone and  hydroxyzine.    Reviewed Dr. Seema Stanley operative notes.  Had same day surgery and was discharged to home after his surgery.      Follow up in 4 weeks.     History of Present Illness   This 44 y.o. old male is here for follow-up.  Called our triage nurse because he has continuing abdominal pains after his ventral hernia repair on 9/3/2019 localized on the operative site.  Afraid there is  an underlying infection causing his abdominal pains.  Did not have untoward events or complications during and after surgery by Dr. Stanley.  Has a follow-up appointment with her in 1  week.  Denies fever.  Operative site is clean without infection.  Was given Percocet which  he continues to take every 4 hours after his surgery.  Wants a refill.  Overall, stable.    Review of Systems   A 12 point comprehensive review of systems was negative except as noted..     Medications, Allergies and Problem List   Reviewed and updated             Chief Complaint   Follow-up (had hernia surgery on 9/3, increased pain since surgery, has not changed dressing on abdomen. He took Percocet 2 tablets every 4 hours due to increased pain. Seeing surgeon next week. )       Patient Profile   Social History     Social History Narrative    Single.  2 children, 15-year-old son and 13-year-old daughter.  On disability.  Smokes half pack of cigarettes per day.  Quit alcohol.  Also reports he stopped using polysubstance like cocaine, marijuana and others. Lives in a safe house.         Past Medical History   Patient Active Problem List   Diagnosis     Severe cannabis use disorder (H)     Bilateral low back pain without sciatica     Thoracic back pain     Lung nodules     Other and unspecified alcohol dependence, unspecified drinking behavior     Substance use disorder     Chronic mental illness     Anxiety     Polysubstance (excluding opioids) dependence (H)     Penile abrasion, initial encounter     Insomnia due to medical condition     Pain of upper abdomen     Personality disorder (H)     Severe alcohol use disorder (H)     Sciatica     Dysuria     Pain, dental     STD exposure     Shoulder pain     Neck muscle spasm     Right lumbar radiculopathy     Agitation     Avascular necrosis of femoral head (H)     Chronic back pain     Suicidal ideation     Schizoaffective disorder, depressive type (H)     Dental injury, sequela     Antisocial personality disorder in adult (H)     Ventral hernia       Past Surgical History  He has a past surgical history that includes Ventral hernia repair (N/A, 9/3/2019).       Medications and  "Allergies   Current Outpatient Medications   Medication Sig     chlorproMAZINE (THORAZINE) 25 MG tablet Take 1 tablet (25 mg total) by mouth 3 (three) times a day as needed (agitation anxiety).     cholecalciferol, vitamin D3, 1,000 unit tablet Take 1 tablet (1,000 Units total) by mouth daily.     gabapentin (NEURONTIN) 300 MG capsule Take 3 capsules (900 mg total) by mouth 3 (three) times a day.     hydrOXYzine pamoate (VISTARIL) 100 MG capsule Take 1 capsule (100 mg total) by mouth daily.     oxyCODONE-acetaminophen (PERCOCET/ENDOCET) 5-325 mg per tablet Take 1-2 tablets by mouth every 6 (six) hours as needed for pain.     risperiDONE (RISPERDAL) 3 MG tablet Take 1 tablet (3 mg total) by mouth at bedtime.     tiZANidine (ZANAFLEX) 2 MG tablet Take 2 tablets (4 mg total) by mouth every 8 (eight) hours as needed (back pain).     traZODone (DESYREL) 50 MG tablet TAKE 1 TABLET(50 MG) BY MOUTH AT BEDTIME     Allergies   Allergen Reactions     Ibuprofen Nausea And Vomiting     Previously tolerated     Iodinated Contrast Media Nausea And Vomiting     Pt reports a black dye that he drank not sure what kind of dye?     Ioxaglate Sodium Nausea And Vomiting     Latex Nausea And Vomiting     Nsaids (Non-Steroidal Anti-Inflammatory Drug) Nausea And Vomiting        Family and Social History   Family History   Problem Relation Age of Onset     Bipolar disorder Sister         x2     Suicidality Mother         Social History     Tobacco Use     Smoking status: Current Some Day Smoker     Packs/day: 1.00     Smokeless tobacco: Never Used   Substance Use Topics     Alcohol use: Yes     Comment: 1 week     Drug use: Yes     Frequency: 7.0 times per week     Types: Marijuana        Physical Exam       Physical Exam  /64   Pulse 70   Temp 98.4  F (36.9  C) (Oral)   Ht 5' 9\" (1.753 m)   Wt 153 lb (69.4 kg)   SpO2 100%   BMI 22.59 kg/m    General appearance: alert, appears stated age, cooperative, no distress and in mild to " moderate pain  Head: Normocephalic, without obvious abnormality, atraumatic  Throat: lips, mucosa, and tongue normal; teeth and gums normal  Neck: no adenopathy, no carotid bruit, no JVD, supple, symmetrical, trachea midline and thyroid not enlarged, symmetric, no tenderness/mass/nodules  Lungs: clear to auscultation bilaterally  Heart: regular rate and rhythm, S1, S2 normal, no murmur, click, rub or gallop  Abdomen: soft, tender operative site on the navel but no signs of infection  Extremities: extremities normal, atraumatic, no cyanosis or edema  Skin: Skin color, texture, turgor normal. No rashes or lesions  Psychiatric: Normal affect and mood     Additional Information        Arnaldo Gan MD  Internal Medicine  Contact me at [phone]     Additional Information   Current Outpatient Medications   Medication Sig     chlorproMAZINE (THORAZINE) 25 MG tablet Take 1 tablet (25 mg total) by mouth 3 (three) times a day as needed (agitation anxiety).     cholecalciferol, vitamin D3, 1,000 unit tablet Take 1 tablet (1,000 Units total) by mouth daily.     gabapentin (NEURONTIN) 300 MG capsule Take 3 capsules (900 mg total) by mouth 3 (three) times a day.     hydrOXYzine pamoate (VISTARIL) 100 MG capsule Take 1 capsule (100 mg total) by mouth daily.     oxyCODONE-acetaminophen (PERCOCET/ENDOCET) 5-325 mg per tablet Take 1-2 tablets by mouth every 6 (six) hours as needed for pain.     risperiDONE (RISPERDAL) 3 MG tablet Take 1 tablet (3 mg total) by mouth at bedtime.     tiZANidine (ZANAFLEX) 2 MG tablet Take 2 tablets (4 mg total) by mouth every 8 (eight) hours as needed (back pain).     traZODone (DESYREL) 50 MG tablet TAKE 1 TABLET(50 MG) BY MOUTH AT BEDTIME     Allergies   Allergen Reactions     Ibuprofen Nausea And Vomiting     Previously tolerated     Iodinated Contrast Media Nausea And Vomiting     Pt reports a black dye that he drank not sure what kind of dye?     Ioxaglate Sodium Nausea And Vomiting      Latex Nausea And Vomiting     Nsaids (Non-Steroidal Anti-Inflammatory Drug) Nausea And Vomiting     Social History     Tobacco Use     Smoking status: Current Some Day Smoker     Packs/day: 1.00     Smokeless tobacco: Never Used   Substance Use Topics     Alcohol use: Yes     Comment: 1 week     Drug use: Yes     Frequency: 7.0 times per week     Types: Marijuana         Time: total time spent with the patient was 40 minutes of which >50% was spent in counseling and coordination of care

## 2021-06-01 NOTE — TELEPHONE ENCOUNTER
Call from pt  - declined triage       Requesting appt with PCP (restricted to Dr Gan)      Would like to be seen for sinus pain      A/P:     Did set up appt for tomorrow (soonest)     No questions about at home care in the mean time         Jose Ryan, RN   Triage and Medication Refills

## 2021-06-01 NOTE — TELEPHONE ENCOUNTER
Pt says that he is in extreme pain despite taking the prescribed Percocet 2 tabs every 6 hours and using ice packs. He is wondering about have the dosage of his pain medication increased to  mg instead of 5-325 mg.  I let him know that I will d/w  and get back to him.  Pt verbalized understanding.    Eli Castanon RN, [facility]  P [phone]  F [phone]

## 2021-06-02 VITALS — WEIGHT: 158 LBS | HEIGHT: 71 IN | BODY MASS INDEX: 22.12 KG/M2

## 2021-06-02 VITALS — WEIGHT: 156 LBS | BODY MASS INDEX: 21.84 KG/M2 | HEIGHT: 71 IN

## 2021-06-02 VITALS — WEIGHT: 160 LBS | BODY MASS INDEX: 22.4 KG/M2 | HEIGHT: 71 IN

## 2021-06-02 NOTE — TELEPHONE ENCOUNTER
New Appointment Needed  What is the reason for the visit:    Same Date/Next Day Appt Request  What is the reason for your visit?:  Left hand is painful since February, patient thinks cast was removed too early, also pain in big toe    Provider Preference: PCP only, Patient is in restricted medical assistance program and is limited to seeing Dr Gan- Offered nurse triage, patient declined for now.  How soon do you need to be seen?: today  Waitlist offered?: No  Okay to leave a detailed message:  Yes

## 2021-06-02 NOTE — TELEPHONE ENCOUNTER
Central PA team  [phone]  Pool: [facility] ([id number])          PA has been initiated.       PA form completed and faxed insurance via Cover My Meds     Key:  [id number] - PA Case ID: [id number] - Rx #: [id number]     Medication:  Diclofenac Sodium 1% gel    Insurance:  [employer]         Response will be received via fax and may take up to 5-10 business days depending on plan

## 2021-06-02 NOTE — PROGRESS NOTES
Office Visit - Follow Up   Kaushik Lopez   44 y.o. male    Date of Visit: 10/14/2019    Chief Complaint   Patient presents with     Follow-up     has not followed up with surgeon from hernia repair, has some slight pain. Has referral for pain clinic- awaiting appt, want to discuss spasms in back that radiates to neck pain- leads to migraine headaches     Insomnia     hard time falling asleep-wants increase in trazodone        Assessment and Plan   1. Chronic bilateral low back pain without sciatica  Has chronic bilateral low back pains.  Wants refill of his muscle relaxant.  Okay to get tizanidine.  Wants to take stronger pain medication but I dissuaded him from starting it again.  Will try diclofenac gel to be applied 4 times a day to the lower back.  Has referral to the pain clinic and awaiting his appointment.  - tiZANidine (ZANAFLEX) 2 MG tablet; Take 2 tablets (4 mg total) by mouth every 8 (eight) hours as needed (back pain).  Dispense: 30 tablet; Refill: 1  - diclofenac sodium (VOLTAREN) 1 % Gel; Apply 2 g topically 4 (four) times a day.  Dispense: 1 Tube; Refill: 3    2. Insomnia due to medical condition  Has insomnia and anxiety.  Used to take clonazepam at bedtime.  Apparently this works better than his trazodone.  Okay to resume clonazepam 1 mg at bedtime and for anxiety as needed.  - clonazePAM (KLONOPIN) 1 MG tablet; Take 1 tablet (1 mg total) by mouth at bedtime as needed for anxiety.  Dispense: 30 tablet; Refill: 0    3. Chronic mental illness  Has chronic mental illness but this is now in remission at least at this time.  Followed by psychiatry.    4. Schizoaffective disorder, depressive type (H)  Has schizoaffective disorder causing her chronic mental illness.  Followed by psychiatry.    Declines flu shot.    Follow up in 4 months.     History of Present Illness   This 44 y.o. old male is here for follow-up.  Has chronic bilateral low back pains.  Wants refill of his muscle relaxant.  Wants to try  a stronger pain medication.  But he is going to see the pain clinic for his chronic pains soon.  Will  dissuade him from  taking opiate pain med again.  Has chronic mental illness due to schizophrenia.  But this is stable and controlled.  Followed by psychiatry.  Complains of insomnia and anxiety.  Took trazodone which does not help his insomnia and anxiety.  Wants to try something else.  Overall, stable.    Review of Systems   A 12 point comprehensive review of systems was negative except as noted..     Medications, Allergies and Problem List   Reviewed and updated             Chief Complaint   Follow-up (has not followed up with surgeon from hernia repair, has some slight pain. Has referral for pain clinic- awaiting appt, want to discuss spasms in back that radiates to neck pain- leads to migraine headaches) and Insomnia (hard time falling asleep-wants increase in trazodone)       Patient Profile   Social History     Patient does not qualify to have social determinant information on file (likely too young).   Social History Narrative    Single.  2 children, 15-year-old son and 13-year-old daughter.  On disability.  Smokes half pack of cigarettes per day.  Quit alcohol.  Also reports he stopped using polysubstance like cocaine, marijuana and others. Lives in a safe house.         Past Medical History   Patient Active Problem List   Diagnosis     Severe cannabis use disorder (H)     Bilateral low back pain without sciatica     Thoracic back pain     Lung nodules     Other and unspecified alcohol dependence, unspecified drinking behavior     Substance use disorder     Chronic mental illness     Anxiety     Polysubstance (excluding opioids) dependence (H)     Penile abrasion, initial encounter     Insomnia due to medical condition     Pain of upper abdomen     Personality disorder (H)     Severe alcohol use disorder (H)     Sciatica     Dysuria     Pain, dental     STD exposure     Shoulder pain     Neck muscle spasm      Right lumbar radiculopathy     Agitation     Avascular necrosis of femoral head (H)     Chronic back pain     Suicidal ideation     Schizoaffective disorder, depressive type (H)     Dental injury, sequela     Antisocial personality disorder in adult (H)     Ventral hernia       Past Surgical History  He has a past surgical history that includes Ventral hernia repair (N/A, 9/3/2019).       Medications and Allergies   Current Outpatient Medications   Medication Sig     chlorproMAZINE (THORAZINE) 25 MG tablet Take 1 tablet (25 mg total) by mouth 3 (three) times a day as needed (agitation anxiety).     cholecalciferol, vitamin D3, 1,000 unit tablet Take 1 tablet (1,000 Units total) by mouth daily.     cyclobenzaprine (FLEXERIL) 10 MG tablet TAKE 1 TABLET BY MOUTH THREE TIMES DAILY     gabapentin (NEURONTIN) 300 MG capsule Take 3 capsules (900 mg total) by mouth 3 (three) times a day.     hydrOXYzine pamoate (VISTARIL) 100 MG capsule Take 1 capsule (100 mg total) by mouth daily.     risperiDONE (RISPERDAL) 3 MG tablet Take 1 tablet (3 mg total) by mouth at bedtime.     clonazePAM (KLONOPIN) 1 MG tablet Take 1 tablet (1 mg total) by mouth at bedtime as needed for anxiety.     diclofenac sodium (VOLTAREN) 1 % Gel Apply 2 g topically 4 (four) times a day.     tiZANidine (ZANAFLEX) 2 MG tablet Take 2 tablets (4 mg total) by mouth every 8 (eight) hours as needed (back pain).     traZODone (DESYREL) 50 MG tablet TAKE 1 TABLET(50 MG) BY MOUTH AT BEDTIME     Allergies   Allergen Reactions     Ibuprofen Nausea And Vomiting     Previously tolerated     Iodinated Contrast Media Nausea And Vomiting     Pt reports a black dye that he drank not sure what kind of dye?     Ioxaglate Sodium Nausea And Vomiting     Latex Nausea And Vomiting     Nsaids (Non-Steroidal Anti-Inflammatory Drug) Nausea And Vomiting        Family and Social History   Family History   Problem Relation Age of Onset     Bipolar disorder Sister         x2      "Suicidality Mother         Social History     Tobacco Use     Smoking status: Current Some Day Smoker     Packs/day: 1.00     Smokeless tobacco: Never Used   Substance Use Topics     Alcohol use: Yes     Comment: 1 week     Drug use: Yes     Frequency: 7.0 times per week     Types: Marijuana        Physical Exam       Physical Exam  BP 98/56   Pulse 78   Ht 5' 9\" (1.753 m)   Wt 154 lb (69.9 kg)   SpO2 98%   BMI 22.74 kg/m    General appearance: alert, appears stated age, cooperative and no distress  Head: Normocephalic, without obvious abnormality, atraumatic  Throat: lips, mucosa, and tongue normal; teeth and gums normal  Neck: no adenopathy, no carotid bruit, no JVD, supple, symmetrical, trachea midline and thyroid not enlarged, symmetric, no tenderness/mass/nodules  Lungs: clear to auscultation bilaterally  Heart: regular rate and rhythm, S1, S2 normal, no murmur, click, rub or gallop  Abdomen: soft, non-tender; bowel sounds normal; no masses,  no organomegaly  Extremities: extremities normal, atraumatic, no cyanosis or edema  Skin: Skin color, texture, turgor normal. No rashes or lesions  Musculoskeletal: Normal back  Psychiatric: Appropriate affect and mood     Additional Information        Arnaldo Gan MD  Internal Medicine  Contact me at [phone]     Additional Information   Current Outpatient Medications   Medication Sig     chlorproMAZINE (THORAZINE) 25 MG tablet Take 1 tablet (25 mg total) by mouth 3 (three) times a day as needed (agitation anxiety).     cholecalciferol, vitamin D3, 1,000 unit tablet Take 1 tablet (1,000 Units total) by mouth daily.     cyclobenzaprine (FLEXERIL) 10 MG tablet TAKE 1 TABLET BY MOUTH THREE TIMES DAILY     gabapentin (NEURONTIN) 300 MG capsule Take 3 capsules (900 mg total) by mouth 3 (three) times a day.     hydrOXYzine pamoate (VISTARIL) 100 MG capsule Take 1 capsule (100 mg total) by mouth daily.     risperiDONE (RISPERDAL) 3 MG tablet Take 1 tablet (3 mg " total) by mouth at bedtime.     clonazePAM (KLONOPIN) 1 MG tablet Take 1 tablet (1 mg total) by mouth at bedtime as needed for anxiety.     diclofenac sodium (VOLTAREN) 1 % Gel Apply 2 g topically 4 (four) times a day.     tiZANidine (ZANAFLEX) 2 MG tablet Take 2 tablets (4 mg total) by mouth every 8 (eight) hours as needed (back pain).     traZODone (DESYREL) 50 MG tablet TAKE 1 TABLET(50 MG) BY MOUTH AT BEDTIME     Allergies   Allergen Reactions     Ibuprofen Nausea And Vomiting     Previously tolerated     Iodinated Contrast Media Nausea And Vomiting     Pt reports a black dye that he drank not sure what kind of dye?     Ioxaglate Sodium Nausea And Vomiting     Latex Nausea And Vomiting     Nsaids (Non-Steroidal Anti-Inflammatory Drug) Nausea And Vomiting     Social History     Tobacco Use     Smoking status: Current Some Day Smoker     Packs/day: 1.00     Smokeless tobacco: Never Used   Substance Use Topics     Alcohol use: Yes     Comment: 1 week     Drug use: Yes     Frequency: 7.0 times per week     Types: Marijuana         Time: total time spent with the patient was 25 minutes of which >50% was spent in counseling and coordination of care

## 2021-06-02 NOTE — TELEPHONE ENCOUNTER
"Prior Authorization Request  Who s requesting:  Pharmacy  Pharmacy Name and Location: [address] [place] [address]   Medication Name: Diclofenac Sodium 1% gel    To Submit the PA for Kaushik Lopez:    1. Go to [email or web address] and click \"Enter a key\".    Key: [id number]  Patient Last Name: John  : 1975  Informed patient that prior authorizations can take up to 10 business days for response:   Yes  Okay to leave a detailed message: Yes    Rosa M Kimball LPN    "

## 2021-06-02 NOTE — PROGRESS NOTES
Office Visit - Follow Up   Kaushik Lopez   44 y.o. male    Date of Visit: 10/29/2019    Chief Complaint   Patient presents with     Hand Pain     L thumb injury back in april, feels it had not healed properly. Took cast off to early, and did not follow up with ortho      Toe Pain     L foot 2 toe is rubbing on big toe causing pain and affecting his walking         Assessment and Plan   1. Insomnia due to medical condition  Has difficulty of falling and maintaining his sleep.  Takes clonazepam at work.  Wants refill.  - clonazePAM (KLONOPIN) 1 MG tablet; Take 1 tablet (1 mg total) by mouth at bedtime as needed for anxiety.  Dispense: 30 tablet; Refill: 0    2. Chronic pain of left thumb  Complains of chronic pain of left hand which started 6 months ago after slipped on the ice and fell.  Hurt his left hand specifically his left thumb.  Went to [facility] ER.  And was evaluated with x-ray of the left hand and thumb which was negative for fracture.  Subsequently followed up with [facility].  Had repeat x-ray of the left thumb which is also showed it was negative for fracture.  Was diagnosed with partial tearing of the left thumb ulnar ligament.  Was put in a spica splint which he wore for few weeks and discontinued wearing it after his left arm improved.  Did not follow-up with orthopedics after.  Now complains of recurrence of left thumb pains which have been going on for several weeks.  Wants something to ease his pains while waiting to see the pain clinic.  Will  prescribe limited amount of Tylenol with codeine.  Will do MRI of the left hand without contrastfirst and then will refer back to [facility].  - MR Hand Without Contrast Left; Future  - acetaminophen-codeine (TYLENOL #3) 300-30 mg per tablet; Take 1 tablet by mouth every 6 (six) hours as needed for pain.  Dispense: 20 tablet; Refill: 0    3. Left foot pain  Also complains of left foot pains particularly involving the big  toe because the second toe is becoming hammertoe causing skin to skin contact on the big toe.  Now has a blister with inflammation on the big toe just below the nailbed and tender to touch.  Wants something to take for the pains and something to apply.  Will prescribe limited amount of Tylenol with codeine for pain control.  Will provide bacitracin ointment to apply twice a day to the inflamed skin.  Will refer to podiatry.  - Ambulatory referral to Podiatry  - bacitracin 500 unit/gram ointment; Apply topically 2 (two) times a day.  Dispense: 30 g; Refill: 3  - acetaminophen-codeine (TYLENOL #3) 300-30 mg per tablet; Take 1 tablet by mouth every 6 (six) hours as needed for pain.  Dispense: 20 tablet; Refill: 0    Seeing the pain clinic soon for his chronic back pains which makes him fall into chronic pain syndrome.  His pains are now aggravated by left thumb and left foot pains.      Follow up in 4 weeks.     History of Present Illness   This 44 y.o. old male complains of left foot pains and left thumb pains.  Started to have left thumb pain 6 months ago after slipping on an ice and fell.  Hurt his left thumb during the fall.  Went to [facility] ER and was evaluated.  Had x-ray of the left hand which was negative for fracture.  Subsequently was followed at [facility].  Had a repeat x-ray of the left hand and thumb which was negative also for fracture.  Left hand was put in a spica splint which he wore for a few weeks until his left thumb pain improved.  Took it out by himself.  Now complains of recurrence of left thumb pains.  Was diagnosed by [facility] to have a partial tear of the left ulnar ligament.  Did not follow up with orthopedics for this.  Complains also of left foot pains involving the first and second toes.  Has hammertoe appearance of the second toe causing skin to skin contact on the  great toe.  Now has tender blister with inflammation below the nailbed.  Has insomnia.  Has  difficulty falling and maintaining his sleep.  Takes clonazepam which helps.  Wants to get a refill.    Review of Systems   A 12 point comprehensive review of systems was negative except as noted..     Medications, Allergies and Problem List   Reviewed and updated             Chief Complaint   Hand Pain (L thumb injury back in april, feels it had not healed properly. Took cast off to early, and did not follow up with ortho ) and Toe Pain (L foot 2 toe is rubbing on big toe causing pain and affecting his walking )       Patient Profile   Social History     Patient does not qualify to have social determinant information on file (likely too young).   Social History Narrative    Single.  2 children, 15-year-old son and 13-year-old daughter.  On disability.  Smokes half pack of cigarettes per day.  Quit alcohol.  Also reports he stopped using polysubstance like cocaine, marijuana and others. Lives in a safe house.         Past Medical History   Patient Active Problem List   Diagnosis     Severe cannabis use disorder (H)     Bilateral low back pain without sciatica     Thoracic back pain     Lung nodules     Other and unspecified alcohol dependence, unspecified drinking behavior     Substance use disorder     Chronic mental illness     Anxiety     Polysubstance (excluding opioids) dependence (H)     Penile abrasion, initial encounter     Insomnia due to medical condition     Pain of upper abdomen     Personality disorder (H)     Severe alcohol use disorder (H)     Sciatica     Dysuria     Pain, dental     STD exposure     Shoulder pain     Neck muscle spasm     Right lumbar radiculopathy     Agitation     Avascular necrosis of femoral head (H)     Chronic back pain     Suicidal ideation     Schizoaffective disorder, depressive type (H)     Dental injury, sequela     Antisocial personality disorder in adult (H)     Ventral hernia       Past Surgical History  He has a past surgical history that includes Ventral hernia  "repair (N/A, 9/3/2019).       Medications and Allergies   Current Outpatient Medications   Medication Sig     cholecalciferol, vitamin D3, 1,000 unit tablet Take 1 tablet (1,000 Units total) by mouth daily.     diclofenac sodium (VOLTAREN) 1 % Gel Apply 2 g topically 4 (four) times a day.     gabapentin (NEURONTIN) 300 MG capsule Take 3 capsules (900 mg total) by mouth 3 (three) times a day.     hydrOXYzine pamoate (VISTARIL) 100 MG capsule Take 1 capsule (100 mg total) by mouth daily.     risperiDONE (RISPERDAL) 3 MG tablet Take 1 tablet (3 mg total) by mouth at bedtime.     tiZANidine (ZANAFLEX) 2 MG tablet Take 2 tablets (4 mg total) by mouth every 8 (eight) hours as needed (back pain).     acetaminophen-codeine (TYLENOL #3) 300-30 mg per tablet Take 1 tablet by mouth every 6 (six) hours as needed for pain.     bacitracin 500 unit/gram ointment Apply topically 2 (two) times a day.     clonazePAM (KLONOPIN) 1 MG tablet Take 1 tablet (1 mg total) by mouth at bedtime as needed for anxiety.     Allergies   Allergen Reactions     Ibuprofen Nausea And Vomiting     Previously tolerated     Iodinated Contrast Media Nausea And Vomiting     Pt reports a black dye that he drank not sure what kind of dye?     Ioxaglate Sodium Nausea And Vomiting     Latex Nausea And Vomiting     Nsaids (Non-Steroidal Anti-Inflammatory Drug) Nausea And Vomiting        Family and Social History   Family History   Problem Relation Age of Onset     Bipolar disorder Sister         x2     Suicidality Mother         Social History     Tobacco Use     Smoking status: Current Some Day Smoker     Packs/day: 1.00     Smokeless tobacco: Never Used   Substance Use Topics     Alcohol use: Yes     Comment: 1 week     Drug use: Yes     Frequency: 7.0 times per week     Types: Marijuana        Physical Exam       Physical Exam  /72   Pulse 78   Ht 5' 9\" (1.753 m)   Wt 151 lb (68.5 kg)   SpO2 99%   BMI 22.30 kg/m    General appearance: alert, " appears stated age, cooperative and no distress  Head: Normocephalic, without obvious abnormality, atraumatic  Lungs: clear to auscultation bilaterally  Heart: regular rate and rhythm, S1, S2 normal, no murmur, click, rub or gallop  Abdomen: soft, non-tender; bowel sounds normal; no masses,  no organomegaly  Extremities: extremities normal, atraumatic, no cyanosis or edema  Skin: Skin color, texture, turgor normal. No rashes or lesions  Musculoskeletal: Tender left thumb with normal range of motion.  Left foot shows hammertoe of the second toe overriding the great toe     Additional Information        Arnaldo Gan MD  Internal Medicine  Contact me at [phone]     Additional Information   Current Outpatient Medications   Medication Sig     cholecalciferol, vitamin D3, 1,000 unit tablet Take 1 tablet (1,000 Units total) by mouth daily.     diclofenac sodium (VOLTAREN) 1 % Gel Apply 2 g topically 4 (four) times a day.     gabapentin (NEURONTIN) 300 MG capsule Take 3 capsules (900 mg total) by mouth 3 (three) times a day.     hydrOXYzine pamoate (VISTARIL) 100 MG capsule Take 1 capsule (100 mg total) by mouth daily.     risperiDONE (RISPERDAL) 3 MG tablet Take 1 tablet (3 mg total) by mouth at bedtime.     tiZANidine (ZANAFLEX) 2 MG tablet Take 2 tablets (4 mg total) by mouth every 8 (eight) hours as needed (back pain).     acetaminophen-codeine (TYLENOL #3) 300-30 mg per tablet Take 1 tablet by mouth every 6 (six) hours as needed for pain.     bacitracin 500 unit/gram ointment Apply topically 2 (two) times a day.     clonazePAM (KLONOPIN) 1 MG tablet Take 1 tablet (1 mg total) by mouth at bedtime as needed for anxiety.     Allergies   Allergen Reactions     Ibuprofen Nausea And Vomiting     Previously tolerated     Iodinated Contrast Media Nausea And Vomiting     Pt reports a black dye that he drank not sure what kind of dye?     Ioxaglate Sodium Nausea And Vomiting     Latex Nausea And Vomiting     Nsaids  (Non-Steroidal Anti-Inflammatory Drug) Nausea And Vomiting     Social History     Tobacco Use     Smoking status: Current Some Day Smoker     Packs/day: 1.00     Smokeless tobacco: Never Used   Substance Use Topics     Alcohol use: Yes     Comment: 1 week     Drug use: Yes     Frequency: 7.0 times per week     Types: Marijuana         Time: total time spent with the patient was 25 minutes of which >50% was spent in counseling and coordination of care

## 2021-06-02 NOTE — TELEPHONE ENCOUNTER
Dr. Gan,     Diclofenac Sodium Gel is not covered by insurance, the covered alternatives are; celecoxib, meloxicam, naproxen, fluribiprofen, indomethacin, diclofenac sodium (oral) sulindac, ketoprofen.     Please advise which alternative you would prefer for patient and call and update patient.     Rosa M Kimball LPN

## 2021-06-03 VITALS — BODY MASS INDEX: 21.42 KG/M2 | HEIGHT: 71 IN | WEIGHT: 153 LBS

## 2021-06-03 VITALS
OXYGEN SATURATION: 99 % | SYSTOLIC BLOOD PRESSURE: 118 MMHG | HEIGHT: 69 IN | DIASTOLIC BLOOD PRESSURE: 72 MMHG | BODY MASS INDEX: 22.36 KG/M2 | HEART RATE: 78 BPM | WEIGHT: 151 LBS

## 2021-06-03 VITALS — BODY MASS INDEX: 21.28 KG/M2 | WEIGHT: 152 LBS | HEIGHT: 71 IN

## 2021-06-03 VITALS
BODY MASS INDEX: 22.66 KG/M2 | OXYGEN SATURATION: 100 % | SYSTOLIC BLOOD PRESSURE: 104 MMHG | TEMPERATURE: 98.4 F | HEIGHT: 69 IN | DIASTOLIC BLOOD PRESSURE: 64 MMHG | HEART RATE: 70 BPM | WEIGHT: 153 LBS

## 2021-06-03 VITALS
BODY MASS INDEX: 21.28 KG/M2 | HEART RATE: 76 BPM | HEIGHT: 71 IN | RESPIRATION RATE: 16 BRPM | TEMPERATURE: 98.8 F | WEIGHT: 152 LBS

## 2021-06-03 VITALS — BODY MASS INDEX: 21.34 KG/M2 | BODY MASS INDEX: 21.34 KG/M2 | HEIGHT: 71 IN | HEIGHT: 71 IN

## 2021-06-03 VITALS — WEIGHT: 149 LBS | BODY MASS INDEX: 21.33 KG/M2 | HEIGHT: 70 IN

## 2021-06-03 VITALS
BODY MASS INDEX: 22.07 KG/M2 | WEIGHT: 149 LBS | WEIGHT: 149 LBS | HEIGHT: 69 IN | HEIGHT: 69 IN | BODY MASS INDEX: 22.07 KG/M2

## 2021-06-03 VITALS
OXYGEN SATURATION: 98 % | BODY MASS INDEX: 22.81 KG/M2 | HEART RATE: 78 BPM | DIASTOLIC BLOOD PRESSURE: 56 MMHG | SYSTOLIC BLOOD PRESSURE: 98 MMHG | HEIGHT: 69 IN | WEIGHT: 154 LBS

## 2021-06-03 VITALS — BODY MASS INDEX: 21.34 KG/M2 | WEIGHT: 153 LBS | WEIGHT: 153 LBS | BODY MASS INDEX: 21.34 KG/M2

## 2021-06-03 NOTE — TELEPHONE ENCOUNTER
Who is calling:  Patient   Reason for Call:  I want Arnaldo Gan MD assistant to return my call. Patient voiced his concerns about not having transportation to his appointment and having to walk with infected feet. Patient stressed  Arnaldo Gan MD assistant helps me and I need her to return my call.      Date of last appointment with primary care: 10/12/19  Okay to leave a detailed message: Yes

## 2021-06-03 NOTE — TELEPHONE ENCOUNTER
Spoke with patient and Md. Md is going to send abx and pain medication as ordered by Ed to pharmacy today for the patient.    Rosa M Kimball LPN

## 2021-06-03 NOTE — TELEPHONE ENCOUNTER
Who is calling:  Patient   Reason for Call:  I need Arnaldo Gan MD to cosign my Rx because I am a restricted patient and I was seen in the ED for an infected toe.  Patient is very upset stating I was mistreated at the ER and I need my medication right now! I am in a lot of pain! I was kicked out of the ED and forced to walk home on my infected feet. Patient was transferred to the [facility] to address concerns of care.   Please follow up with patient to assist with Rx signature.   Date of last appointment with primary care: 10/29/19  Okay to leave a detailed message: Yes

## 2021-06-03 NOTE — TELEPHONE ENCOUNTER
Patient needs antibiotic and pain medication refilled from Ed last night.     Rosa M Kimball LPN

## 2021-06-03 NOTE — TELEPHONE ENCOUNTER
Patient calling.  He is reporting that he was in [facility] last night treated for infection in feet, or foot.  He reports , he was thrown out of the ER last night, and did become verbally escalated on the phone with RN writer.  He was advised several times to let writer know what he wanted to do today. Looks like he was given antibiotics, and bacitracin ointment, and hydrocodone for pain while in ER.  Patient was advised that we could make a follow up appointment for him, but he insists that he should have a medical transport to the doctors office first.  Patient states he will attempt to call and schedule a ride , and then call back for an appointment.    Nemo Hunt RN  [facility] Triage/refill nurse

## 2021-06-03 NOTE — TELEPHONE ENCOUNTER
Advise patient to see me for follow up of his foot problem. Advise to take his antibiotic as prescribed and clean, dress his wound as recommended.

## 2021-06-03 NOTE — TELEPHONE ENCOUNTER
Says he had a procedure done on his foot 3 weeks ago.  Says he ran out of supplies to soak his foot - peroxide and bandages .  Has not soaked his feet in two or three days.  Says they are infected now.  Both feet are swollen, red and painful.  Left leg is worse - swelling and redness is half way up the leg.   Says he is feeling sick.  Is limping.  No known fever - does not have thermometer.  Shortness of breath with exertion    Says he has been drinking for the last 2-3 days because of the pain.    Triaged to disposition of Go to ED Now.  Advised patient he should not drive.    Lianne Live RN  Triage Nurse Advisor    Reason for Disposition    [1] Difficulty breathing with exertion (e.g., walking) AND [2] new onset or worsening    [1] Red area or streak [2] large (> 2 in. or 5 cm)    Protocols used: LEG SWELLING AND EDEMA-A-AH

## 2021-06-03 NOTE — TELEPHONE ENCOUNTER
Spoke with him and was able to give him some gaze, band aids and ointment to help his toes heal.    Arelis Chavez CMA ([facility])

## 2021-06-03 NOTE — TELEPHONE ENCOUNTER
Medication Request  Medication name: cephalexin (KEFLEX) 500 MG capsule and HYDROcodone-acetaminophen 5-325 mg per tablet  Pharmacy Name and Location: [facility] # [id number]  Reason for request: Orders per ER Discharge today [place] - patient states he is waiting for doctors to sign off on his prescriptions so he can get his medications. He is upset and urgently requesting the meds.  When did you use medication last?:  ER  Patient offered appointment:  patient declined  Okay to leave a detailed message: yes

## 2021-06-03 NOTE — TELEPHONE ENCOUNTER
"On 11/20, patient walked into clinic as he was in the ED earlier that AM and was unable to fill Hydrocodone script from Ed due to being restricted to Dr. Gan. Patients toes were cleaned with sterile water and bandaged with non adhesive gauze and wrapped with gauze wrap and tapped as advised by Dr. Gan, and patient was sent home. Dr. Gan was updated regarding situation and re prescribed cephalexin and 5 tablets of Hydrocodone. Then patient called several times on 11/21 and another script was given to him from Md for 5 tablets of Hydrocodone as patient was still in pain. Patient was informed that provider did a script and he would need to come to the office to pick it up. Patient continued to  State he needed to have stronger pain medication , prescriber stated he needed to be seen in order to get a stronger medication. Patient was informed yesterday of all of this and scheduled appt for Monday 11/25/19 at 2pm. Writer also confirmed with several phone calls on 11/21 that patient was taking antibiotic, and patient stated \"yes, and I am out of my pain meds\" several times.  Patient agreed with plan yesterday when writer spoke with him. Patient then walked into clinic to get paper script and requested to speak with writer and writer was unavailable to speak with patient as writer was working with another patient and provider. Writer asked clinic manager to assist with Kaushik's concerns in the lobby. Writer received message below and spoke with patient today. He became loud, verbally abusive on phone and continued to talk over writer. Patient stated, \"yall continue to make me come out in the cold when my feet are infected. You don't understand what I am going through\". Writer attempted to inform patient that if he can not make appt for Monday as stated below that he can schedule to be seen another day. The reason for the appointment on Monday was for him to discuss pain and be evaluated with Md on Monday. Writer " was unable to get this information relayed to patient as he became loud , swearing and verbally abusive with writer.     Patient is requesting a call from clinic manager regarding situation.    Rosa M Kimball LPN

## 2021-06-03 NOTE — TELEPHONE ENCOUNTER
"Pt reports he was in the ER last night for \"infected feet\" and states he was \"kicked out\" and escorted out by security. Pt reports \"they didn't wrap my foot or anything\". Pt continued complaining about his treatment in the ER last night and states \"you all have a lawsuit coming\" and advised Writer he has multiple resources working on \"this\". Writer asked pt several times what Writer could do for him and pt stated he wanted patience and \"you all need to start acting like doctors and nurses\". Pt did not seem interested in anything but venting to Writer. Writer advised pt he had not called a counseling line and pt advised Writer \"you wouldn't make a good counselor anyway\". Pt continued to speak rapidly to Writer despite Writer attempting to give him phone number for pt advocate . Pt then asked Writers name and advised Writer \"you will be hearing from them\". Pt then ended phone call by hanging up on Writer.     Reason for Disposition    Caller hangs up    Protocols used: DIFFICULT CALLER-A-AH      "

## 2021-06-03 NOTE — TELEPHONE ENCOUNTER
Pt in clinic, had procedure on his foot 11/7/19 wants to speak to care team for Dr. Gan, needs bandages and oinment

## 2021-06-03 NOTE — PROGRESS NOTES
FOOT AND ANKLE SURGERY/PODIATRY CONSULT NOTE         ASSESSMENT:   Ingrown toenails both great toes     TREATMENT:  Performed partial nail excision and matrixectomy of the medial and lateral borders of both great toenails.        HPI: I was asked to see Kaushik Lopez today to evaluate and treat painful ingrown toenails both great toes.  The patient stated that the ingrown toenails are extremely painful.  They are even sensitive to touch.  He has had this problem for several months.  He denies any trauma to his feet.  He has not had any associated redness or swelling.  It is a very sharp pain which is aggravated by weightbearing and ambulation.  He has mild pain even while resting.  He has not had any drainage or bleeding.  He denies any other previous treatment.  The patient was seen in consultation at the request of Arnaldo Gan MD for evaluation and treatment of painful ingrown toenails both great toes.     Past Medical History:   Diagnosis Date     Alcohol consumption binge drinking      Anemia      Anxiety      Bilateral low back pain without sciatica      Bipolar disorder (H)      Cannabis dependence (H)      Chronic mental illness      Hepatitis     Unknown type     Homicidal ideation      Non compliance w medication regimen      Pain of upper abdomen 10/13/2016     Personality disorder (H)      Polysubstance (excluding opioids) dependence (H)      Schizoaffective disorder (H)      Schizoaffective disorder, depressive type (H) 4/6/2018     Seizures (H)     no seizures for 4-5 years     Severe cannabis use disorder (H)      Suicidal behavior      Thoracic back pain        Past Surgical History:   Procedure Laterality Date     VENTRAL HERNIA REPAIR N/A 9/3/2019    Procedure: HERNIORRHAPHY, VENTRAL, OPEN with MESH;  Surgeon: Seema Stanley DO;  Location: [facility];  Service: General       Allergies   Allergen Reactions     Ibuprofen Nausea And Vomiting     Previously tolerated     Iodinated Contrast  Media Nausea And Vomiting     Pt reports a black dye that he drank not sure what kind of dye?     Ioxaglate Sodium Nausea And Vomiting     Latex Nausea And Vomiting     Nsaids (Non-Steroidal Anti-Inflammatory Drug) Nausea And Vomiting         Current Outpatient Medications:      acetaminophen-codeine (TYLENOL #3) 300-30 mg per tablet, Take 1 tablet by mouth every 6 (six) hours as needed for pain., Disp: 20 tablet, Rfl: 0     bacitracin 500 unit/gram ointment, Apply topically 2 (two) times a day., Disp: 30 g, Rfl: 3     bacitracin ointment, APPLY TOPICALLY AA BID, Disp: , Rfl: 3     cholecalciferol, vitamin D3, 1,000 unit tablet, Take 1 tablet (1,000 Units total) by mouth daily., Disp: 100 tablet, Rfl: 3     clonazePAM (KLONOPIN) 1 MG tablet, Take 1 tablet (1 mg total) by mouth at bedtime as needed for anxiety., Disp: 30 tablet, Rfl: 0     diclofenac sodium (VOLTAREN) 1 % Gel, Apply 2 g topically 4 (four) times a day., Disp: 1 Tube, Rfl: 3     gabapentin (NEURONTIN) 300 MG capsule, Take 3 capsules (900 mg total) by mouth 3 (three) times a day., Disp: 270 capsule, Rfl: 3     hydrOXYzine pamoate (VISTARIL) 100 MG capsule, Take 1 capsule (100 mg total) by mouth daily., Disp: 90 capsule, Rfl: 3     risperiDONE (RISPERDAL) 3 MG tablet, Take 1 tablet (3 mg total) by mouth at bedtime., Disp: 30 tablet, Rfl: 1     tiZANidine (ZANAFLEX) 2 MG tablet, Take 2 tablets (4 mg total) by mouth every 8 (eight) hours as needed (back pain)., Disp: 30 tablet, Rfl: 1    Family History   Problem Relation Age of Onset     Bipolar disorder Sister         x2     Suicidality Mother        Social History     Socioeconomic History     Marital status: Single     Spouse name: Not on file     Number of children: 2     Years of education: Not on file     Highest education level: Not on file   Occupational History     Occupation: Unemployed   Social Needs     Financial resource strain: Not on file     Food insecurity:     Worry: Not on file      Inability: Not on file     Transportation needs:     Medical: Not on file     Non-medical: Not on file   Tobacco Use     Smoking status: Current Some Day Smoker     Packs/day: 1.00     Smokeless tobacco: Never Used   Substance and Sexual Activity     Alcohol use: Yes     Comment: 1 week     Drug use: Yes     Frequency: 7.0 times per week     Types: Marijuana     Sexual activity: Yes     Partners: Female     Birth control/protection: None   Lifestyle     Physical activity:     Days per week: Not on file     Minutes per session: Not on file     Stress: Not on file   Relationships     Social connections:     Talks on phone: Not on file     Gets together: Not on file     Attends [employer] service: Not on file     Active member of club or organization: Not on file     Attends meetings of clubs or organizations: Not on file     Relationship status: Not on file     Intimate partner violence:     Fear of current or ex partner: Not on file     Emotionally abused: Not on file     Physically abused: Not on file     Forced sexual activity: Not on file   Other Topics Concern     Not on file   Social History Narrative    Single.  2 children, 15-year-old son and 13-year-old daughter.  On disability.  Smokes half pack of cigarettes per day.  Quit alcohol.  Also reports he stopped using polysubstance like cocaine, marijuana and others. Lives in a safe house.        Review of Systems - Patient denies fever, chills, rash, wound, stiffness, limping, numbness, weakness, heart burn, blood in stool, chest pain with activity, calf pain when walking, shortness of breath with activity, chronic cough, easy bleeding/bruising, swelling of ankles, excessive thirst, fatigue, depression, anxiety.  Patient admits to painful ingrown toenails both great toes.      OBJECTIVE:  Appearance: alert, well appearing, and in no distress.    There were no vitals filed for this visit.    BMI= There is no height or weight on file to calculate BMI.    General  appearance: Patient is alert and fully cooperative with history & exam.  No sign of distress is noted during the visit.  Psychiatric: Affect is pleasant & appropriate.  Patient appears motivated to improve health.  Respiratory: Breathing is regular & unlabored while sitting.  HEENT: Hearing is intact to spoken word.  Speech is clear.  No gross evidence of visual impairment that would impact ambulation.    Vascular: Dorsalis pedis and posterior tibial pulses are palpable. There is no pedal hair growth bilaterally.  CFT < 3 sec from anterior tibial surface to distal digits bilaterally. There is no appreciable edema noted.  Dermatologic: The medial and lateral borders of both great toenails are severely incurvated.  Turgor and texture are within normal limits. No coloration or temperature changes. No primary or secondary lesions noted.  Neurologic: All epicritic and proprioceptive sensations are grossly intact bilaterally.  Musculoskeletal: All active and passive ankle, subtalar, midtarsal, and 1st MPJ range of motion are grossly intact without pain or crepitus, with the exception of none. Manual muscle strength is within normal limits bilaterally. All dorsiflexors, plantarflexors, invertors, evertors are intact bilaterally. Tenderness present to medial and lateral borders of both great toenails on palpation.  No tenderness to both great toe with range of motion. Calf is soft/non-tender without warmth/induration    Imaging:     No results found.       TREATMENT:  Performed partial nail excision and matrixectomy of the medial and lateral borders of both great toenails today under local anesthesia of 2% lidocaine plain via the phenol and alcohol technique.  The patient tolerated the procedures and anesthesia well and was discharged in good condition.  The patient was given both written and verbal postoperative instructions.  The patient was asked to return to the clinic as needed.  He was told to expect some mild to  moderate drainage and weeping and redness for the next several weeks.    Rolando Wolfe; PRADEEP  [facility] Foot & Ankle Surgery/Podiatry

## 2021-06-03 NOTE — TELEPHONE ENCOUNTER
"Pt is calling in about needing pain medication, and wanting to speak to the clinic. Several notes were put in yesterday. Pt spoke to a triage nurse yesterday, and then he went to the ER, where he was escorted out by security. Pt states his foot pain is \"8-9\".   Please contact Kaushik at [phone] about pain medication.     Provider please advise.    Andrea Eng RN [facility] Triage/Medication Refill  "

## 2021-06-03 NOTE — TELEPHONE ENCOUNTER
New Appointment Needed  What is the reason for the visit:    Inpatient/ED Follow Up Appt Request  At what hospital or facility were you seen?: [facility]'s ER 11/20  What is the reason you were seen?: Foot pain, feet are infected from surgery.  What date were you admitted?: date: 11/20  What date were you discharged?: date: 11/20  What was the recommended timeframe for your follow up appointment?: as soon as he can get in   Provider Preference: PCP only  How soon do you need to be seen?: Today or tomorrow   Waitlist offered?: No  Okay to leave a detailed message:  Yes

## 2021-06-03 NOTE — TELEPHONE ENCOUNTER
Spoke with Dr. Gan and per Md  He ok'd 5 tablets of Vicodin for pain today.     Patient aware of script sent to today and will see Dr. Gan on Monday at 2pm.     Rosa M Kimball LPN

## 2021-06-03 NOTE — TELEPHONE ENCOUNTER
Dr. Gan,  Okay to schedule patient today at 10:20 slot?  Please advise.  Thank you.  Sabine KHOURY, VIVIENNE/RHONA....................8:33 AM

## 2021-06-03 NOTE — PATIENT INSTRUCTIONS - HE
POSTOPERATIVE INSTRUCTIONS AFTER CHEMICAL NAIL REMOVAL SURGERY    What to expect after surgery:  Your toe will be numb for around 2-8 hours after your nail procedure due to the shots that were given.  Expect some degree of soreness in your toe later today when the numbness wears off.  Rest, elevation and ice applied at the ankle will help ease the pain. Your bandage was wrapped snug to cut down on bleeding.    This may feel tight when the numbness wears off.  Please remove the bandage tomorrow morning and begin the foot soaks described below.  Warm water will feel good and helps to ease the pain  How to Care for Your Toe After Surgery  One daily foot soak will be necessary to heal properly.  Chemicals were used to kill the root of the nail.  Expect local redness, drainage and tenderness , this will last for 6-8 weeks.  Soaking helps loosen the scab and dried drainage.  Failure to soak leads to a hard scab that blocks drainage.  Back up drainage increases the pain and the scab may need to be removed with another office procedure.  You will heal much quicker and be more comfortable if you are consistent with local wound care and foot soaks.  Soak one time every day for 2 weeks.  Soaks should last 10 minutes.  Soak after taking a shower to get the germs out.  Soak in warm water with hydrogen peroxide 5 parts water to 1 part hydrogen peroxide.  A small amount of bleeding may be noted which is normal.   Clean the surgical site with a Q-tip during each soak.  Dip the Q-tip in the water and gently clean away any crusted drainage.  The area may be tender but you will not harm anything with the Q-tip.  Pat the area dry and allow a few minutes to air dry before applying any bandages.  Flexible fabric style band aids work best.  In general, the area will be wet from drainage.  A small dab of antibiotic ointment is okay but watch out for excessive moisture.  White and wrinkled skin is a sign of too much moisture and that the  skin needs to be dried out. It is ok to allow the toe some air by removing the band aid as needed.  Activity  Feel free to do normal activities as tolerated on the following day.  Wear open toe sandals if regular shoes are not comfortable.  Avoid shoes that are tight on the toe.  Medications   Finish any antibiotics if they have been prescribed.  Tylenol or ibuprofen may be used as needed for pain.  Icing and elevation also help with pain and swelling.  Risks  Watch for signs of infection.  It is normal to see redness, local tenderness, and drainage around the nail area for up to 8 weeks after permanent nail removal surgery.  Call the clinic at [phone] if you see red streaks spreading up the toe, foot, or leg.  Fever and chills are also concerning and you should notify the clinic if you are having these symptoms.  If these symptoms occur when the clinic is closed, please go to urgent care.  How Well Does Permanent Nail Surgery Work?  Permanent nail surgery means that we intend that the nail problem will not come back.  In a small percentage of patients, nail problems return in about 6 months to a year.  We would like to see you back in the office if you are experiencing problems in the future.  Please call [phone] with any additional questions.

## 2021-06-04 NOTE — PROGRESS NOTES
Patient had been scheduled for an evaluation at the pain center on 12/20.  Had been referred from an emergency room visit.    Live letter today that 11/26, he was terminated from an [employer] for all providers.  He notes reflect he was verbally abusive to staff.    Appears [facility].  They do have pain clinics to whom the patient could be referred.

## 2021-06-05 NOTE — TELEPHONE ENCOUNTER
Pts Care Provider: Dr. Stanley    Caller: Kaushik    Phone Number: [phone]  OK to leave message: Yes    Reason for Call: Pt is calling with post op complications or issues. He is experiencing pain and discomfort.   Stated that his bowel movements have not been regular since the end of November.  Stated that he is has been having a nauseated spells for a couple weeks.    He was unable to make to his post op appointment due insurance issues.    He is expressing frustration and confusion that he has not been able to follow up with the provider that completed the surgery.  Please call to discuss his concerns and issues

## 2021-06-13 NOTE — PROGRESS NOTES
Office Visit-New Patient   Kaushik Lopez   42 y.o. male    Date of Visit: 10/17/2017    Chief Complaint   Patient presents with     Establish Care      advised him to establish care with an MD regarding mental health     Back Pain     constant lower back pain         Assessment and Plan   1. Schizoaffective disorder, bipolar type  Has schizoaffective disorder.  Has not followed with his psychiatrist.  Now hearing some voices. But able to function.  Only takes Haldol as needed as listed on  the discharge summary from [facility] in January.  Apparently he has not taken this med since his discharge.  Advised patient I am not psychiatrist to handle his schizoaffective disorder.  Understands  to see a psychiatrist.  Will refer him to psychiatry.  - Ambulatory referral to Psychiatry    2. Personality disorder  Has personality disorder but this seems to be stable.    3. Bilateral low back pain without sciatica  Complains of chronic back pains.  Coming off and on.  No leg pain, numbness and weakness.  Apparently had good response to diazepam for muscle spasm given during his hospitalization in January.  Wants to continue this med.  Used to take gabapentin for his back pains.  Will continue gabapentin 3 times a day and and will give him diazepam 2 mg every 8 hours as needed.  Will refer him to spine care clinic for his chronic back pains.  Discussed at length with the patient  he needs a specialty clinic where he can be treated and followed for his back pains.  Agrees to go to the spine care clinic.  - gabapentin (NEURONTIN) 300 MG capsule; Take 1 capsule (300 mg total) by mouth 3 (three) times a day.  Dispense: 90 capsule; Refill: 0  - diazePAM (VALIUM) 2 MG tablet; Take 1 tablet (2 mg total) by mouth every 8 (eight) hours as needed.  Dispense: 30 tablet; Refill: 0  - Ambulatory referral to Spine Care    4. Substance use disorder  Reports he stopped abusing polysubstances like cocaine, marijuana and  alcohol.  Only vice now he smokes cigarettes half pack a day.    Follow up in 3 months.     History of Present Illness   This 42 y.o. old male new to our practice but has been seen several times at the emergency room and mental health clinic.  Complains to me of chronic back pains.  Pains come and go.  No radiation of pains to his legs.  No weakness, numbness and tingling sensation of his legs.  No urinary or bowel symptoms with his back pains.  Has schizoaffective  and personality  disorder.  Followed by psychiatry.  But has not been back to see psychiatry due to insurance changes.  Reports he hears some voices.  But he has not not acted on them.  Remains functional and stable.    Review of Systems   A 12 point comprehensive review of systems was negative except as noted..     Medications, Allergies and Problem List   Reviewed and updated             Chief Complaint   Establish Care ( advised him to establish care with an MD regarding mental health) and Back Pain (constant lower back pain )       Patient Profile   Social History     Social History Narrative    Single.  2 children, 15-year-old son and 13-year-old daughter.  On disability.  Smokes half pack of cigarettes per day.  Quit alcohol.  Also reports he stepped using polysubstance like cocaine, marijuana and others.        Past Medical History   Patient Active Problem List   Diagnosis     Severe cannabis use disorder     Bilateral low back pain without sciatica     Thoracic back pain     Lung nodules     Other and unspecified alcohol dependence, unspecified drinking behavior     Neuroleptic consent form discussed and signed: October 6, 2016     Substance use disorder     Chronic mental illness     Anxiety     Polysubstance (excluding opioids) dependence     Sore throat     Penile abrasion, initial encounter     Insomnia due to medical condition     Pain of upper abdomen     Schizoaffective disorder, depressive type     Suicidal behavior      Homicidal ideation     Schizoaffective disorder, bipolar type     Personality disorder       Past Surgical History  He has no past surgical history on file.       Medications and Allergies   Current Outpatient Prescriptions   Medication Sig     cholecalciferol, vitamin D3, 1,000 unit tablet Take 2,000 Units by mouth daily.     diphenhydrAMINE (BENADRYL) 25 mg tablet Take with Haldol tab.     cyclobenzaprine (FLEXERIL) 10 MG tablet Take 10 mg by mouth 3 (three) times a day as needed for muscle spasms.     diazePAM (VALIUM) 2 MG tablet Take 1 tablet (2 mg total) by mouth every 8 (eight) hours as needed.     gabapentin (NEURONTIN) 300 MG capsule Take 1 capsule (300 mg total) by mouth 3 (three) times a day.     Allergies   Allergen Reactions     Contrast [Iohexol]      Ibuprofen Nausea And Vomiting     Previously tolerated     Ibuprofen Sodium Other (See Comments)     Stomach upset     Iodinated Contrast- Oral And Iv Dye Nausea And Vomiting     Pt reports a black dye that he drank not sure what kind of dye?     Ioxaglate Sodium Nausea And Vomiting     Latex Nausea And Vomiting     Nsaids (Non-Steroidal Anti-Inflammatory Drug) Nausea And Vomiting        Family and Social History   Family History   Problem Relation Age of Onset     Bipolar disorder Sister      x2     Suicidality Mother         Social History   Substance Use Topics     Smoking status: Smoker, Current Status Unknown     Packs/day: 1.00     Smokeless tobacco: Never Used     Alcohol use Yes      Comment: for the last 2-3 weeks.        Physical Exam       Physical Exam  General appearance: alert, appears stated age, cooperative and no distress  Head: Normocephalic, without obvious abnormality, atraumatic  Throat: lips, mucosa, and tongue normal; teeth and gums normal  Neck: no adenopathy, no carotid bruit, no JVD, supple, symmetrical, trachea midline and thyroid not enlarged, symmetric, no tenderness/mass/nodules  Lungs: clear to auscultation  bilaterally  Heart: regular rate and rhythm, S1, S2 normal, no murmur, click, rub or gallop  Abdomen: soft, non-tender; bowel sounds normal; no masses,  no organomegaly  Extremities: extremities normal, atraumatic, no cyanosis or edema  Skin: Skin color, texture, turgor normal. No rashes or lesions  Neurologic: Grossly normal  Musculoskeletal: Normal exam     Additional Information        Arnaldo Gan MD  Internal Medicine  Contact me at None     Additional Information   Current Outpatient Prescriptions   Medication Sig     cholecalciferol, vitamin D3, 1,000 unit tablet Take 2,000 Units by mouth daily.     diphenhydrAMINE (BENADRYL) 25 mg tablet Take with Haldol tab.     cyclobenzaprine (FLEXERIL) 10 MG tablet Take 10 mg by mouth 3 (three) times a day as needed for muscle spasms.     diazePAM (VALIUM) 2 MG tablet Take 1 tablet (2 mg total) by mouth every 8 (eight) hours as needed.     gabapentin (NEURONTIN) 300 MG capsule Take 1 capsule (300 mg total) by mouth 3 (three) times a day.     Allergies   Allergen Reactions     Contrast [Iohexol]      Ibuprofen Nausea And Vomiting     Previously tolerated     Ibuprofen Sodium Other (See Comments)     Stomach upset     Iodinated Contrast- Oral And Iv Dye Nausea And Vomiting     Pt reports a black dye that he drank not sure what kind of dye?     Ioxaglate Sodium Nausea And Vomiting     Latex Nausea And Vomiting     Nsaids (Non-Steroidal Anti-Inflammatory Drug) Nausea And Vomiting     Social History   Substance Use Topics     Smoking status: Smoker, Current Status Unknown     Packs/day: 1.00     Smokeless tobacco: Never Used     Alcohol use Yes      Comment: for the last 2-3 weeks.     Time spent: 45 minutes, 50% of which was spent on counseling, coordinating and managing his medical care.

## 2021-06-14 NOTE — PROGRESS NOTES
"Office Visit - Follow up    Kaushik Lopez   42 y.o. male    Date of Visit: 11/16/2017    Chief Complaint   Patient presents with     Pain     Right shoulder pain for 3 weeks       Subjective: Patient of Dr. Gan with history of polysubstance abuse suicidal ideation schizoaffective disorder chronic pain syndrome and multiple other conditions previously noted and reviewed in chart.  (He apparently is restricted to Dr. Gan because of polysubstance abuse)    He comes in today with pain in the shoulder and back.  He is requesting analgesics for his pain.  Current medications reviewed    Was reportedly seen in [facility] emergency room yesterday with suicidal ideation he denies that at this time.  Indicates he has severe right shoulder pain for the past 3 weeks does not recall an injury but notes significant pain.        ROS: A comprehensive review of systems was performed and was otherwise negative except as mentioned above.     Exam  I briefly saw the patient however he did leave prior to my being able to examine his shoulder.  I indicated he likely should be seen by an orthopedic surgeon and I would be unwilling to prescribe narcotics for him particularly given his history of abuse and other health concerns    Patient became quite angry he refused my offer to refer him to orthopedic surgeon and or or so quick.   /60  Pulse 86  Ht 5' 11\" (1.803 m)  Wt 150 lb (68 kg)  BMI 20.92 kg/m2    Assessment and Plan  Right shoulder pain of uncertain etiology with chronic pain syndrome and multiple concerns as noted above and below    Patient left this office precipitously after it was apparent I would not give him narcotic medications    Kaushik was seen today for pain.    Diagnoses and all orders for this visit:    Chronic mental illness    Schizoaffective disorder, bipolar type    Substance use disorder    Polysubstance (excluding opioids) dependence    Acute bilateral low back pain without sciatica    Chronic back " pain    Right shoulder pain          Time: total time spent with the patient was 20 minutes of which >50% was spent in counseling and coordination of care        Allergies   Allergen Reactions     Contrast [Iohexol]      Ibuprofen Nausea And Vomiting     Previously tolerated     Ibuprofen Sodium Other (See Comments)     Stomach upset     Iodinated Contrast- Oral And Iv Dye Nausea And Vomiting     Pt reports a black dye that he drank not sure what kind of dye?     Ioxaglate Sodium Nausea And Vomiting     Latex Nausea And Vomiting     Nsaids (Non-Steroidal Anti-Inflammatory Drug) Nausea And Vomiting       Medications :  Prior to Admission medications    Medication Sig Start Date End Date Taking? Authorizing Provider   diazePAM (VALIUM) 2 MG tablet Take 1 tablet (2 mg total) by mouth every 8 (eight) hours as needed. 10/17/17   Arnaldo Gan MD   diphenhydrAMINE (BENADRYL) 25 mg tablet Take with Haldol tab. 1/17/17   Karoline Rocha MD   gabapentin (NEURONTIN) 300 MG capsule Take 1 capsule (300 mg total) by mouth 3 (three) times a day. 10/17/17   Arnaldo Gan MD   gabapentin (NEURONTIN) 400 MG capsule Take 800 mg by mouth. 11/18/15   PROVIDER, HISTORICAL   hydrOXYzine (VISTARIL) 50 MG capsule Take 50 mg by mouth. 5/1/17   PROVIDER, HISTORICAL   melatonin 3 mg Tab tablet Take 3 mg by mouth. 11/18/15   PROVIDER, HISTORICAL   naproxen (NAPROSYN) 500 MG tablet Take 500 mg by mouth. 7/18/16   PROVIDER, HISTORICAL   QUEtiapine (SEROQUEL) 25 MG tablet     PROVIDER, HISTORICAL   cholecalciferol, vitamin D3, 1,000 unit tablet Take 2,000 Units by mouth daily.  11/16/17  PROVIDER, HISTORICAL   cyclobenzaprine (FLEXERIL) 10 MG tablet Take 10 mg by mouth 3 (three) times a day as needed for muscle spasms.  11/16/17  PROVIDER, HISTORICAL        Past Medical History:   Past Medical History:   Diagnosis Date     Alcohol consumption binge drinking      Bipolar disorder      Cannabis dependence      Hepatitis     Unknown type      Non compliance w medication regimen      Pain of upper abdomen 10/13/2016     Schizoaffective disorder      Thoracic back pain        Past Surgical History: No past surgical history on file.    Social History:   Social History     Social History     Marital status: Single     Spouse name: N/A     Number of children: 2     Years of education: N/A     Occupational History     Unemployed      Social History Main Topics     Smoking status: Smoker, Current Status Unknown     Packs/day: 1.00     Smokeless tobacco: Never Used     Alcohol use Yes      Comment: for the last 2-3 weeks.     Drug use: 7.00 per week     Special: Marijuana     Sexual activity: Yes     Partners: Female     Birth control/ protection: None     Other Topics Concern     Not on file     Social History Narrative    Single.  2 children, 15-year-old son and 13-year-old daughter.  On disability.  Smokes half pack of cigarettes per day.  Quit alcohol.  Also reports he stepped using polysubstance like cocaine, marijuana and others.       Family History:   Family History   Problem Relation Age of Onset     Bipolar disorder Sister      x2     Suicidality Mother          Torey Israel MD

## 2021-06-16 PROBLEM — F60.2 ANTISOCIAL PERSONALITY DISORDER IN ADULT (H): Status: ACTIVE | Noted: 2018-07-20

## 2021-06-16 PROBLEM — R30.0 DYSURIA: Status: ACTIVE | Noted: 2018-03-18

## 2021-06-16 PROBLEM — R45.851 SUICIDAL IDEATION: Status: ACTIVE | Noted: 2018-07-14

## 2021-06-16 PROBLEM — F25.1 SCHIZOAFFECTIVE DISORDER, DEPRESSIVE TYPE (H): Chronic | Status: ACTIVE | Noted: 2018-07-14

## 2021-06-16 PROBLEM — K43.9 VENTRAL HERNIA: Status: ACTIVE | Noted: 2019-08-22

## 2021-06-16 PROBLEM — F10.20 SEVERE ALCOHOL USE DISORDER (H): Chronic | Status: ACTIVE | Noted: 2018-01-03

## 2021-06-16 PROBLEM — M54.16 RIGHT LUMBAR RADICULOPATHY: Status: ACTIVE | Noted: 2018-04-10

## 2021-06-16 PROBLEM — K08.89 PAIN, DENTAL: Status: ACTIVE | Noted: 2018-03-18

## 2021-06-16 PROBLEM — M87.059 AVASCULAR NECROSIS OF FEMORAL HEAD (H): Status: ACTIVE | Noted: 2018-04-12

## 2021-06-16 PROBLEM — M54.30 SCIATICA: Status: ACTIVE | Noted: 2018-01-03

## 2021-06-16 NOTE — TELEPHONE ENCOUNTER
"Telephone Encounter by Karolyn Flores at 10/22/2019  8:22 AM     Author: Karolyn Flores Service: -- Author Type: --    Filed: 10/22/2019  8:25 AM Encounter Date: 10/14/2019 Status: Signed    : Karolyn Flores       PRIOR AUTHORIZATION DENIED    Denial Rational:  Medication is being prescribed for \"off label\" use.  Insurance only covers when being prescribed for FDA approved diagnosis.              If provider would like to appeal please provide a letter of medical necessity and route back to the PA team.        "

## 2021-06-16 NOTE — TELEPHONE ENCOUNTER
Telephone Encounter by Sabine Dickerson CMA at 10/22/2019  8:45 AM     Author: Sabine Dickerson CMA Service: -- Author Type: Medical Assistant    Filed: 10/22/2019  8:46 AM Encounter Date: 10/14/2019 Status: Signed    : Sabine Dickerson CMA (Medical Assistant)       Dr. Gan,  Please review the following message from the PA team and advise:   [facility] Team [facility] 19 minutes ago (8:25 AM)      Hi,   This Medication was denied. If the provider would like to appeal please provide a letter of medical necessity and route back to the team pool: [facility] PA MED. Otherwise you can close the encounter.   Thank you,   [facility] PA Team    Routing comment      Thank you.  Sabine KHOURY CMA/RHONA....................8:46 AM

## 2021-06-16 NOTE — TELEPHONE ENCOUNTER
Telephone Encounter by Karolyn Flores at 10/21/2019  8:31 AM     Author: Karolyn Flores Service: -- Author Type: --    Filed: 10/21/2019  8:37 AM Encounter Date: 10/14/2019 Status: Signed    : Karolyn Flores       Please advise:

## 2021-06-16 NOTE — PROGRESS NOTES
Office Visit - Follow Up   Kaushik Lopez   42 y.o. male    Date of Visit: 3/26/2018    Chief Complaint   Patient presents with     Hospital Visit Follow Up     [facility] 3/18-3/19. Staying at [facility] in [place]     Back Pain     low back pain, and sciatic-tried advil and tylenol and not effective         Assessment and Plan   1. Schizoaffective disorder, depressive type  Has a flare  of his schizophrenia.  Was admitted to [facility] from 3/15/2018 to 3/20/2018.  Was treated accordingly by the psychiatrist, Dr. Foote.  Now taking risperidone, Seroquel and gabapentin.  But thinks  these meds are not holding well his depressive type schizophrenia.  Used to take Cymbalta.  Will resume Cymbalta 30 mg daily.  - DULoxetine (CYMBALTA) 30 MG capsule; Take 1 capsule (30 mg total) by mouth daily for 14 days.  Dispense: 14 capsule; Refill: 0    2. Anxiety  Has anxiety.  Continue gabapentin.  Seems to help his anxiety.  Cymbalta will also help.    3. STD exposure  His nurse in the [facility] where he is staying now after he was discharged from [facility] wants him to be tested for sexually transmitted disease.  Remains sexually active.  Last active back sexual contact was few days ago with a woman of unknown character.  Check the following.  - Chlamydia trachomatis & Neisseria gonorrhoeae, Amplified Detection  - Syphilis Screen, [place]  - HIV Antigen/Antibody Screening [place]    4. Glaucoma  Apparently diagnosed to have glaucoma by his previous eye doctor.  Needs  ophthalmology eval and follow-up as requested by the nurse at [facility].  Refer to ophthalmology.  - Ambulatory referral to Ophthalmology    5. Chronic bilateral low back pain without sciatica  Has chronic bilateral low back pain without sciatica.  Reports he  takes Advil and Tylenol  which apparently are not affected to control his pains.  Will refer him to spine care clinic.   Okay to use Tylenol No. 3 but will only give him 20 tablets to try.  Also wants to get something for his back muscle spasm.  Will try tizanidine.  - Ambulatory referral to Spine Care  - acetaminophen-codeine (TYLENOL #3) 300-30 mg per tablet; Take 1 tablet by mouth every 4 (four) hours as needed for pain.  Dispense: 20 tablet; Refill: 0  - tiZANidine (ZANAFLEX) 2 MG tablet; Take 2 tablets (4 mg total) by mouth every 8 (eight) hours as needed.  Dispense: 30 tablet; Refill: 0    Reviewed his hospital notes both by the ED and the psychiatrist notes and all his workups.      Follow up in 6 weeks.     History of Present Illness   This 42 y.o. old male is here for post hospital follow-up.  Was admitted to [facility] unit for flare of his schizophrenia, depressive type.  Stayed in the hospital for 5 days from 3/15/2018 to 3/20/2018.  Was treated accordingly by his psychiatrist, Dr. Foote.  Now takes risperidone, quetiapine and gabapentin.  [facility] discharged to crisis residence.  His nurse wants him to be tested for sexually transmitted disease, to be evaluated by an eye doctor for his history of glaucoma.  Complains of chronic low back pains without sciatica.  Reports Advil and Tylenol do not alleviate his back pains.  Wants to take something else.  Overall, however, he is stable.    Review of Systems   A 12 point comprehensive review of systems was negative except as noted..     Medications, Allergies and Problem List   Reviewed and updated             Chief Complaint   Hospital Visit Follow Up ([facility] 3/18-3/19. Staying at [facility] in [place]) and Back Pain (low back pain, and sciatic-tried advil and tylenol and not effective )       Patient Profile   Social History     Social History Narrative    Single.  2 children, 15-year-old son and 13-year-old daughter.  On disability.  Smokes half pack of cigarettes per day.  Quit alcohol.  Also reports he stopped using  polysubstance like cocaine, marijuana and others.        Past Medical History   Patient Active Problem List   Diagnosis     Severe cannabis use disorder     Bilateral low back pain without sciatica     Thoracic back pain     Lung nodules     Other and unspecified alcohol dependence, unspecified drinking behavior     Neuroleptic consent form discussed and signed: January 3, 2018     Substance use disorder     Chronic mental illness     Anxiety     Polysubstance (excluding opioids) dependence     Sore throat     Penile abrasion, initial encounter     Insomnia due to medical condition     Pain of upper abdomen     Personality disorder     Severe alcohol use disorder     Sciatica     Schizoaffective disorder, depressive type     Dysuria     Toothache     STD exposure     Shoulder pain     Neck muscle spasm       Past Surgical History  He has no past surgical history on file.       Medications and Allergies   Current Outpatient Prescriptions   Medication Sig     gabapentin (NEURONTIN) 300 MG capsule Take 2 capsules (600 mg total) by mouth 3 (three) times a day.     QUEtiapine (SEROQUEL) 50 MG tablet Take 1 tablet (50 mg total) by mouth 2 (two) times a day as needed (Anxiety/agitiation).     risperiDONE (RISPERDAL) 2 MG tablet Take 1 tablet (2 mg total) by mouth 2 (two) times a day.     acetaminophen-codeine (TYLENOL #3) 300-30 mg per tablet Take 1 tablet by mouth every 4 (four) hours as needed for pain.     DULoxetine (CYMBALTA) 30 MG capsule Take 1 capsule (30 mg total) by mouth daily for 14 days.     tiZANidine (ZANAFLEX) 2 MG tablet Take 2 tablets (4 mg total) by mouth every 8 (eight) hours as needed.     Allergies   Allergen Reactions     Ibuprofen Nausea And Vomiting     Previously tolerated     Iodinated Contrast- Oral And Iv Dye Nausea And Vomiting     Pt reports a black dye that he drank not sure what kind of dye?     Ioxaglate Sodium Nausea And Vomiting     Latex Nausea And Vomiting     Nsaids (Non-Steroidal  "Anti-Inflammatory Drug) Nausea And Vomiting        Family and Social History   Family History   Problem Relation Age of Onset     Bipolar disorder Sister      x2     Suicidality Mother         Social History   Substance Use Topics     Smoking status: Smoker, Current Status Unknown     Packs/day: 1.00     Smokeless tobacco: Never Used     Alcohol use Yes      Comment: for the last 2-3 weeks.        Physical Exam       Physical Exam  /60  Pulse 91  Ht 5' 11\" (1.803 m)  Wt 156 lb (70.8 kg)  SpO2 98%  BMI 21.76 kg/m2  General appearance: alert, appears stated age, cooperative and no distress  Head: Normocephalic, without obvious abnormality, atraumatic  Neck: no adenopathy, no carotid bruit, no JVD, supple, symmetrical, trachea midline and thyroid not enlarged, symmetric, no tenderness/mass/nodules  Back: symmetric, no curvature. ROM normal. No CVA tenderness.  Lungs: clear to auscultation bilaterally  Heart: regular rate and rhythm, S1, S2 normal, no murmur, click, rub or gallop  Abdomen: soft, non-tender; bowel sounds normal; no masses,  no organomegaly  Extremities: extremities normal, atraumatic, no cyanosis or edema  Skin: Skin color, texture, turgor normal. No rashes or lesions  Neurologic: Grossly normal  Musculoskeletal: Tender lower back with normal range of motion     Additional Information        Arnaldo Gan MD  Internal Medicine  Contact me at [phone]     Additional Information   Current Outpatient Prescriptions   Medication Sig     gabapentin (NEURONTIN) 300 MG capsule Take 2 capsules (600 mg total) by mouth 3 (three) times a day.     QUEtiapine (SEROQUEL) 50 MG tablet Take 1 tablet (50 mg total) by mouth 2 (two) times a day as needed (Anxiety/agitiation).     risperiDONE (RISPERDAL) 2 MG tablet Take 1 tablet (2 mg total) by mouth 2 (two) times a day.     acetaminophen-codeine (TYLENOL #3) 300-30 mg per tablet Take 1 tablet by mouth every 4 (four) hours as needed for pain.     " DULoxetine (CYMBALTA) 30 MG capsule Take 1 capsule (30 mg total) by mouth daily for 14 days.     tiZANidine (ZANAFLEX) 2 MG tablet Take 2 tablets (4 mg total) by mouth every 8 (eight) hours as needed.     Allergies   Allergen Reactions     Ibuprofen Nausea And Vomiting     Previously tolerated     Iodinated Contrast- Oral And Iv Dye Nausea And Vomiting     Pt reports a black dye that he drank not sure what kind of dye?     Ioxaglate Sodium Nausea And Vomiting     Latex Nausea And Vomiting     Nsaids (Non-Steroidal Anti-Inflammatory Drug) Nausea And Vomiting     Social History   Substance Use Topics     Smoking status: Smoker, Current Status Unknown     Packs/day: 1.00     Smokeless tobacco: Never Used     Alcohol use Yes      Comment: for the last 2-3 weeks.         Time: total time spent with the patient was 40 minutes of which >50% was spent in counseling and coordination of care

## 2021-06-17 NOTE — PROGRESS NOTES
Office Visit - Follow Up   Kaushik Lopez   42 y.o. male    Date of Visit: 4/26/2018    Chief Complaint   Patient presents with     Hospital Visit Follow Up     [facility] from 4/6-4/24.  is helping him find a mental health couselor.         Assessment and Plan   1. Schizoaffective disorder, depressive type  Was admitted for a flare of his schizophrenia to [facility] from 4/6/2018 to 4/24/2018.  Now back to his baseline mood.  Reports he is feeling and doing well.  Continue same psych medication prescribed by his psychiatrist, Dr. Foote.  Needs follow-up with psychiatry.  But unable to get an appointment.  Will refer him to psychiatry to see Dr. Torres and again has chronic bilateral low back pain without sciatica.  Ambulated in the hospital by hospitalist.  - risperiDONE (RISPERDAL) 2 MG tablet; Take 1 tablet (2 mg total) by mouth daily.  Dispense: 30 tablet; Refill: 1  - oxyCODONE (ROXICODONE) 5 MG immediate release tablet; Take 1 tablet (5 mg total) by mouth every 6 (six) hours as needed.  Dispense: 20 tablet; Refill: 0  - hydrOXYzine pamoate (VISTARIL) 100 MG capsule; Take 1 tablet in the AM, and 2 tablets at bedtime  Dispense: 30 capsule; Refill: 1    - Ambulatory referral to Psychiatry    2. Chronic bilateral low back pain without sciatica  Had negative workup including x-ray and MRI of the lumbar spine.  Was seen by the hospitalist.  Was treated conservatively.  Now takes gabapentin, tizanidine and oxycodone which help his back pains.  - tiZANidine (ZANAFLEX) 2 MG tablet; Take 2 tablets (4 mg total) by mouth every 8 (eight) hours as needed (back pain).  Dispense: 30 tablet; Refill: 1  - gabapentin (NEURONTIN) 300 MG capsule; Take 3 capsules (900 mg total) by mouth 3 (three) times a day.  Dispense: 270 capsule; Refill: 1    3. Substance use disorder  Sober from substance use. Has  not used any street drugs or anything since his hospitalization. Doing  well.    4. Anxiety  Not readily anxious after his treatment for  for schizophrenia.    Reviewed his long hospitalization at [facility].  Reviewed all psychiatric notes and hospitalization notes.    Follow up in 3 months.     History of Present Illness   This 42 y.o. old male is here for  his post hospital follow-up.  Was admitted [facility] for flare of his schizophrenia.  Stayed in the hospital for 2 weeks.  Was followed and treated by psychiatry, .  Was advised to see me post discharge for his follow-up.  But apparently does not have follow-up with psychiatry for his schizophrenia.  Most of his problems are psychiatric. Only has chronic back pains for which he takes oxycodone, tizanidine as needed and daily gabapentin..  Was seen by hospitalist in the [facility]ry [facility] for his back pains.  Was treated conservatively.  Now still complains of some back pains off and on.  But needs to be followed by psychiatry for his psych problems.  At least for now his schizophrenia is stable or controlled.  Patient is aware I do not handle his psych problems.    Review of Systems   A 12 point comprehensive review of systems was negative except as noted..     Medications, Allergies and Problem List   Reviewed and updated             Chief Complaint   Hospital Visit Follow Up ([facility] from 4/6-4/24.  is helping him find a mental health couselor. )       Patient Profile   Social History     Social History Narrative    Single.  2 children, 15-year-old son and 13-year-old daughter.  On disability.  Smokes half pack of cigarettes per day.  Quit alcohol.  Also reports he stopped using polysubstance like cocaine, marijuana and others.        Past Medical History   Patient Active Problem List   Diagnosis     Severe cannabis use disorder     Bilateral low back pain without sciatica     Thoracic back pain     Lung nodules     Other and unspecified alcohol dependence, unspecified  drinking behavior     Substance use disorder     Chronic mental illness     Anxiety     Polysubstance (excluding opioids) dependence     Sore throat     Penile abrasion, initial encounter     Insomnia due to medical condition     Pain of upper abdomen     Personality disorder     Severe alcohol use disorder     Sciatica     Dysuria     Toothache     STD exposure     Shoulder pain     Neck muscle spasm     Schizoaffective disorder, depressive type     Right lumbar radiculopathy     Agitation     Avascular necrosis of femoral head     Chronic back pain       Past Surgical History  He has no past surgical history on file.       Medications and Allergies   Current Outpatient Prescriptions   Medication Sig     acetaminophen (TYLENOL) 325 MG tablet Take 2 tablets (650 mg total) by mouth every 4 (four) hours as needed.     cholecalciferol, vitamin D3, 1,000 unit tablet Take 1 tablet (1,000 Units total) by mouth daily.     gabapentin (NEURONTIN) 300 MG capsule Take 3 capsules (900 mg total) by mouth 3 (three) times a day.     hydrOXYzine pamoate (VISTARIL) 100 MG capsule Take 1 tablet in the AM, and 2 tablets at bedtime     oxyCODONE (ROXICODONE) 5 MG immediate release tablet Take 1 tablet (5 mg total) by mouth every 6 (six) hours as needed.     risperiDONE (RISPERDAL) 2 MG tablet Take 1 tablet (2 mg total) by mouth daily.     tiZANidine (ZANAFLEX) 2 MG tablet Take 2 tablets (4 mg total) by mouth every 8 (eight) hours as needed (back pain).     Allergies   Allergen Reactions     Ibuprofen Nausea And Vomiting     Previously tolerated     Iodinated Contrast- Oral And Iv Dye Nausea And Vomiting     Pt reports a black dye that he drank not sure what kind of dye?     Ioxaglate Sodium Nausea And Vomiting     Latex Nausea And Vomiting     Nsaids (Non-Steroidal Anti-Inflammatory Drug) Nausea And Vomiting        Family and Social History   Family History   Problem Relation Age of Onset     Bipolar disorder Sister      x2      "Suicidality Mother         Social History   Substance Use Topics     Smoking status: Smoker, Current Status Unknown     Packs/day: 1.00     Smokeless tobacco: Never Used     Alcohol use Yes      Comment: for the last 2-3 weeks.        Physical Exam       Physical Exam  /60  Pulse 95  Ht 5' 11\" (1.803 m)  Wt 172 lb (78 kg)  SpO2 98%  BMI 23.99 kg/m2  General appearance: alert, appears stated age, cooperative and no distress  Head: Normocephalic, without obvious abnormality, atraumatic  Neck: no adenopathy, no carotid bruit, no JVD, supple, symmetrical, trachea midline and thyroid not enlarged, symmetric, no tenderness/mass/nodules  Back: symmetric, no curvature. ROM normal. No CVA tenderness.  Lungs: clear to auscultation bilaterally  Heart: regular rate and rhythm, S1, S2 normal, no murmur, click, rub or gallop  Abdomen: soft, non-tender; bowel sounds normal; no masses,  no organomegaly  Extremities: extremities normal, atraumatic, no cyanosis or edema  Skin: Skin color, texture, turgor normal. No rashes or lesions  Neurologic: Grossly normal  Musculoskeletal: Normal back exam     Additional Information        Arnaldo Gan MD  Internal Medicine  Contact me at [phone]     Additional Information   Current Outpatient Prescriptions   Medication Sig     acetaminophen (TYLENOL) 325 MG tablet Take 2 tablets (650 mg total) by mouth every 4 (four) hours as needed.     cholecalciferol, vitamin D3, 1,000 unit tablet Take 1 tablet (1,000 Units total) by mouth daily.     gabapentin (NEURONTIN) 300 MG capsule Take 3 capsules (900 mg total) by mouth 3 (three) times a day.     hydrOXYzine pamoate (VISTARIL) 100 MG capsule Take 1 tablet in the AM, and 2 tablets at bedtime     oxyCODONE (ROXICODONE) 5 MG immediate release tablet Take 1 tablet (5 mg total) by mouth every 6 (six) hours as needed.     risperiDONE (RISPERDAL) 2 MG tablet Take 1 tablet (2 mg total) by mouth daily.     tiZANidine (ZANAFLEX) 2 MG " tablet Take 2 tablets (4 mg total) by mouth every 8 (eight) hours as needed (back pain).     Allergies   Allergen Reactions     Ibuprofen Nausea And Vomiting     Previously tolerated     Iodinated Contrast- Oral And Iv Dye Nausea And Vomiting     Pt reports a black dye that he drank not sure what kind of dye?     Ioxaglate Sodium Nausea And Vomiting     Latex Nausea And Vomiting     Nsaids (Non-Steroidal Anti-Inflammatory Drug) Nausea And Vomiting     Social History   Substance Use Topics     Smoking status: Smoker, Current Status Unknown     Packs/day: 1.00     Smokeless tobacco: Never Used     Alcohol use Yes      Comment: for the last 2-3 weeks.         Time: total time spent with the patient was 40 minutes of which >50% was spent in counseling and coordination of care

## 2021-06-18 NOTE — PROGRESS NOTES
Office Visit - Follow Up   Kaushik Lopez   42 y.o. male    Date of Visit: 5/30/2018    Chief Complaint   Patient presents with     Follow-up     INF [facility] 4/6-4/24. Oxy 5mg does not help with pain, only takes the edge off.         Assessment and Plan   1. Bilateral low back pain without sciatica  Complains of chronic low back pains.  Due to degenerative disc disease aggravated apparently by a work injury.  Takes oxycodone, gabapentin and tizanidine which partially relieved his back pains.  Will continue same medications.  But needs  evaluation and treatment by the spine care clinic.  Discussed these with the patient.  Informed spine care clinic has all the modalities how to treat his back pains.  Agrees to go to the spine care clinic.  Okay to get refill of his oxycodone and tizanidine.   - Ambulatory referral to Spine Care  - oxyCODONE (ROXICODONE) 5 MG immediate release tablet; Take 1 tablet (5 mg total) by mouth every 4 (four) hours as needed.  Dispense: 30 tablet; Refill: 0  - tiZANidine (ZANAFLEX) 2 MG tablet; Take 2 tablets (4 mg total) by mouth every 8 (eight) hours as needed (back pain).  Dispense: 30 tablet; Refill: 1    2. Schizoaffective disorder, depressive type  Has schizoaffective disorder depressive type.  Was admitted for flare in April.  Has  not followed with psychiatry after his discharge..  Needs to follow with psychiatry for this.  Will refer back him to psychiatry.  - Ambulatory referral to Psychiatry    3. Personality disorder  Also has some personality disorder but seems to be controlled now.  Pleasant and interactive.  - Ambulatory referral to Psychiatry      Follow up  as needed     History of Present Illness   This 42 y.o. old male is here for follow-up.  Has chronic back pains due to degenerative disease of the lumbar spine.  Apparently started  and got  aggravated by work injury years ago.  Now having recurring back pains.  But  does not have tingling, numbness and weakness of his  lower extremity.  Takes oxycodone, tizanidine and gabapentin which partially relieved his back pains.  Needs more programmed and comprehensive treatment for his back pains.  Has schizoaffective disorder and personality disorder.  Was recently admitted and discharged in April for the flareup of his psych problems.  Has not followed with psychiatry since his discharge.    Review of Systems   A 12 point comprehensive review of systems was negative except as noted..     Medications, Allergies and Problem List   Reviewed and updated             Chief Complaint   Follow-up ([facility] 4/6-4/24. Oxy 5mg does not help with pain, only takes the edge off. )       Patient Profile   Social History     Social History Narrative    Single.  2 children, 15-year-old son and 13-year-old daughter.  On disability.  Smokes half pack of cigarettes per day.  Quit alcohol.  Also reports he stopped using polysubstance like cocaine, marijuana and others.        Past Medical History   Patient Active Problem List   Diagnosis     Severe cannabis use disorder     Bilateral low back pain without sciatica     Thoracic back pain     Lung nodules     Other and unspecified alcohol dependence, unspecified drinking behavior     Substance use disorder     Chronic mental illness     Anxiety     Polysubstance (excluding opioids) dependence     Sore throat     Penile abrasion, initial encounter     Insomnia due to medical condition     Pain of upper abdomen     Personality disorder     Severe alcohol use disorder     Sciatica     Dysuria     Toothache     STD exposure     Shoulder pain     Neck muscle spasm     Schizoaffective disorder, depressive type     Right lumbar radiculopathy     Agitation     Avascular necrosis of femoral head     Chronic back pain       Past Surgical History  He has no past surgical history on file.       Medications and Allergies   Current Outpatient Prescriptions   Medication Sig     acetaminophen (TYLENOL) 325 MG tablet Take  "2 tablets (650 mg total) by mouth every 4 (four) hours as needed.     cholecalciferol, vitamin D3, 1,000 unit tablet Take 1 tablet (1,000 Units total) by mouth daily.     gabapentin (NEURONTIN) 300 MG capsule Take 900 mg by mouth 3 (three) times a day.     hydrOXYzine pamoate (VISTARIL) 100 MG capsule Take 1 tablet in the AM, and 2 tablets at bedtime     oxyCODONE (ROXICODONE) 5 MG immediate release tablet Take 1 tablet (5 mg total) by mouth every 4 (four) hours as needed.     tiZANidine (ZANAFLEX) 2 MG tablet Take 2 tablets (4 mg total) by mouth every 8 (eight) hours as needed (back pain).     Allergies   Allergen Reactions     Ibuprofen Nausea And Vomiting     Previously tolerated     Iodinated Contrast- Oral And Iv Dye Nausea And Vomiting     Pt reports a black dye that he drank not sure what kind of dye?     Ioxaglate Sodium Nausea And Vomiting     Latex Nausea And Vomiting     Nsaids (Non-Steroidal Anti-Inflammatory Drug) Nausea And Vomiting        Family and Social History   Family History   Problem Relation Age of Onset     Bipolar disorder Sister      x2     Suicidality Mother         Social History   Substance Use Topics     Smoking status: Smoker, Current Status Unknown     Packs/day: 1.00     Smokeless tobacco: Never Used     Alcohol use Yes      Comment: for the last 2-3 weeks.        Physical Exam       Physical Exam  /60  Pulse 77  Ht 5' 11\" (1.803 m)  Wt 166 lb (75.3 kg)  SpO2 98%  BMI 23.15 kg/m2  General appearance: alert, appears stated age, cooperative and no distress  Head: Normocephalic, without obvious abnormality, atraumatic  Throat: lips, mucosa, and tongue normal; teeth and gums normal  Neck: no adenopathy, no carotid bruit, no JVD, supple, symmetrical, trachea midline and thyroid not enlarged, symmetric, no tenderness/mass/nodules  Lungs: clear to auscultation bilaterally  Heart: regular rate and rhythm, S1, S2 normal, no murmur, click, rub or gallop  Abdomen: soft, non-tender; " bowel sounds normal; no masses,  no organomegaly  Extremities: extremities normal, atraumatic, no cyanosis or edema  Skin: Skin color, texture, turgor normal. No rashes or lesions  Neurologic: Grossly normal  Musculoskeletal: Normal  back exam     Additional Information        Arnaldo Gan MD  Internal Medicine  Contact me at [phone]     Additional Information   Current Outpatient Prescriptions   Medication Sig     acetaminophen (TYLENOL) 325 MG tablet Take 2 tablets (650 mg total) by mouth every 4 (four) hours as needed.     cholecalciferol, vitamin D3, 1,000 unit tablet Take 1 tablet (1,000 Units total) by mouth daily.     gabapentin (NEURONTIN) 300 MG capsule Take 900 mg by mouth 3 (three) times a day.     hydrOXYzine pamoate (VISTARIL) 100 MG capsule Take 1 tablet in the AM, and 2 tablets at bedtime     oxyCODONE (ROXICODONE) 5 MG immediate release tablet Take 1 tablet (5 mg total) by mouth every 4 (four) hours as needed.     tiZANidine (ZANAFLEX) 2 MG tablet Take 2 tablets (4 mg total) by mouth every 8 (eight) hours as needed (back pain).     Allergies   Allergen Reactions     Ibuprofen Nausea And Vomiting     Previously tolerated     Iodinated Contrast- Oral And Iv Dye Nausea And Vomiting     Pt reports a black dye that he drank not sure what kind of dye?     Ioxaglate Sodium Nausea And Vomiting     Latex Nausea And Vomiting     Nsaids (Non-Steroidal Anti-Inflammatory Drug) Nausea And Vomiting     Social History   Substance Use Topics     Smoking status: Smoker, Current Status Unknown     Packs/day: 1.00     Smokeless tobacco: Never Used     Alcohol use Yes      Comment: for the last 2-3 weeks.         Time: total time spent with the patient was 25 minutes of which >50% was spent in counseling and coordination of care

## 2021-06-18 NOTE — PROGRESS NOTES
Office Visit - Physical   Kaushik Lopez   43 y.o.  male    Date of visit: 6/27/2018  Physician: Arnaldo Gan MD     Assessment and Plan   1. Routine general medical examination at a health care facility  Informed and advised his compressive physical exam is normal.  Check the following.  - Lipid Cascade  - Urinalysis-UC if Indicated  - HM2(CBC w/o Differential)  - Basic Metabolic Panel  - Hepatic Profile    2. Chronic back pain  Has chronic back pains.  To be followed by the pain clinic.  Takes Vistaril 100 mg 1 tablet twice a day with gabapentin 300 mg 3 tablets 3 times a day.  These meds seem to help his back pains but they do not entirely resolved his back pains..    3. Schizoaffective disorder, depressive type (H)  Has schizoaffective disorder.  Followed by psychiatry.  Was admitted in March for flare of his schizoaffective disorder.  Takes psych meds, risperidone at bedtime and tizanidine as needed.    4. Anxiety  Has anxiety but now better controlled.    5. Substance use disorder  Reports he quit using alcohol, marijuana and cocaine.  Only smokes cigarettes consuming 1 pack of cigarettes every 3 days.    6. Potential exposure to STD  Concerned with possible or potential exposure to sexual transmitted disease because he is sexually active with different women.  Will check the following.  - Chlamydia trachomatis & Neisseria gonorrhoeae, Amplified Detection  - Syphilis Screen, Cascade  - HIV Antigen/Antibody Screening Cascade    7. Penile cyst  Has a analysis.  He is concerned if this is due to a sexually transmitted disease.  I reassured him.  But will refer him to urology.  - Ambulatory referral to Urology    FOLLOWUP IN 4 months.     Chief Complaint   Annual Exam ([facility].  Need physical form filled out) and Back Pain (complains of back pain that radiates down to hips and legs-had MRI in hospital. Unable to get in with pain clinic. )       Patient Profile   Social  History     Social History Narrative    Single.  2 children, 15-year-old son and 13-year-old daughter.  On disability.  Smokes half pack of cigarettes per day.  Quit alcohol.  Also reports he stopped using polysubstance like cocaine, marijuana and others. Lives in a safe house.         Past Medical History   Patient Active Problem List   Diagnosis     Severe cannabis use disorder (H)     Bilateral low back pain without sciatica     Thoracic back pain     Lung nodules     Other and unspecified alcohol dependence, unspecified drinking behavior     Substance use disorder     Chronic mental illness     Anxiety     Polysubstance (excluding opioids) dependence (H)     Penile abrasion, initial encounter     Insomnia due to medical condition     Pain of upper abdomen     Personality disorder     Severe alcohol use disorder (H)     Sciatica     Dysuria     Toothache     STD exposure     Shoulder pain     Neck muscle spasm     Schizoaffective disorder, depressive type (H)     Right lumbar radiculopathy     Agitation     Avascular necrosis of femoral head (H)     Chronic back pain       Past Surgical History  He has no past surgical history on file.     History of Present Illness   This 43 y.o. old male is here for his compressive physical exam.  His physical exam is needed by  the [facility] where he  is now staying for the next 90 days.  Has schizoaffective disorder.  Followed by psychiatry.  Was admitted for flare of his schizophrenia  in April.  Stayed in the psych unit  for almost a month.  Was discharged in late April and now being followed by psychiatry  as out patient.  Also has polysubstance abuse.  But reports he has been sober from abusing  alcohol, cocaine and marijuana.  Continues to smoke cigarette but stopped also drinking alcohol.  Has chronic back pains.  Takes combination of  hydroxyzine and gabapentin which   ease his back pains but do not completely resolved his back pains.  To be followed by the pain  clinic for his chronic back pains.  Missed his last appointment.  His anxiety but improved also by hydroxyzine.  Sexually active with different women.  Afraid she is potentially exposed to sexually transmitted disease.  Does not practice safe sex.  Wants to be tested.  Noticed also a lump on his penile area.  Overall feels well.    Review of Systems: A comprehensive review of systems was negative except as noted.     Medications and Allergies   Current Outpatient Prescriptions   Medication Sig     acetaminophen (TYLENOL) 325 MG tablet Take 2 tablets (650 mg total) by mouth every 4 (four) hours as needed.     cholecalciferol, vitamin D3, 1,000 unit tablet Take 1 tablet (1,000 Units total) by mouth daily.     gabapentin (NEURONTIN) 300 MG capsule Take 900 mg by mouth 3 (three) times a day.     hydrOXYzine pamoate (VISTARIL) 100 MG capsule Take 1 tablet in the AM, and 2 tablets at bedtime (Patient taking differently: Take 100 mg by mouth daily. Take 1 tablet in the AM, and 2 tablets at bedtime)     risperiDONE (RISPERDAL) 2 MG tablet Take 2 mg by mouth at bedtime.     tiZANidine (ZANAFLEX) 2 MG tablet Take 2 tablets (4 mg total) by mouth every 8 (eight) hours as needed (back pain).     Allergies   Allergen Reactions     Ibuprofen Nausea And Vomiting     Previously tolerated     Iodinated Contrast- Oral And Iv Dye Nausea And Vomiting     Pt reports a black dye that he drank not sure what kind of dye?     Ioxaglate Sodium Nausea And Vomiting     Latex Nausea And Vomiting     Nsaids (Non-Steroidal Anti-Inflammatory Drug) Nausea And Vomiting        Family and Social History   Family History   Problem Relation Age of Onset     Bipolar disorder Sister      x2     Suicidality Mother         Social History   Substance Use Topics     Smoking status: Smoker, Current Status Unknown     Packs/day: 1.00     Smokeless tobacco: Never Used     Alcohol use Yes      Comment: for the last 2-3 weeks.        Physical Exam   General  "Appearance:   Alert, oriented, bit stressed and anxious but not in pain.    /60  Pulse 66  Ht 5' 10.5\" (1.791 m)  Wt 174 lb (78.9 kg)  SpO2 96%  BMI 24.61 kg/m2    EYES: Eyelids, conjunctiva, and sclera are normal. Pupils are normal. Cornea, iris, and lens are normal bilaterally.  HEAD, EARS, NOSE, MOUTH, AND THROAT: Head and face are normal. Hearing is normal to voice and the ears are normal to external exam. Nose appearance is normal and there is no discharge. Oropharynx is normal.  NECK: Neck appearance is normal. There are no neck masses and the thyroid is not enlarged.  RESPIRATORY: Breathing pattern is normal and the chest moves symmetrically.  Percussion/auscultatory percussion is normal.  Lung sounds are normal and there are no abnormal sounds.  CARDIOVASCULAR: Heart rate and rhythm are normal.  S1 and S2 are normal and there are no extra sounds or murmurs. Peripheral pulses in arms and legs are normal.  Jugular venous pressure is normal.  There is no peripheral edema.  GASTROINTESTINAL: The abdomen is normal in contour.  Bowel sounds are present.  Percussion detected no organ enlargement or tenderness.  Palpation detected no tenderness, mass, or enlarged organs.   MUSCULOSKELETAL: Skeletal configuration is normal and muscle mass is normal for age. Joint appearance is overall normal.  LYMPHATIC: There are no enlarged nodes.  SKIN/HAIR/NAILS: Skin color is normal.  There are no skin lesions.  Hair and nails are normal.  NEUROLOGIC: The patient is alert and oriented to person, place, time, and circumstance. Speech is normal. Cranial nerves are normal. Motor strength is normal for age. The patient is normally coordinated.  PSYCHIATRIC:  Mood and affect are normal and the patient has normal recent and remote memory. The patient's judgment and insight are normal.    RECTAL: Normal perianal exam.  Rectal exam not indicated because of his age.  GENITAL/URINARY: Circumcised.  A lump on the penile area " consistent with a cyst.  No scrotal mass.  No inguinal mass.  No tenderness.     Additional Information        Arnaldo Gan MD  Internal Medicine  Contact me at [phone]

## 2021-06-19 NOTE — LETTER
Letter by Radha Russell PA-C at      Author: Radha Russell PA-C Service: -- Author Type: --    Filed:  Encounter Date: 9/23/2019 Status: Signed         Kaushik Lopez  [address] [place], [place] [address]  [place] [address]               September 23, 2019      Dear Kaushik:    We are sorry that you missed your appointment with Radha Russell on 9/23/2019. Your health and follow-up medical care are important to us. Please call our office as soon as possible so that we may reschedule your appointment. If you have already rescheduled your appointment, please disregard this letter.    Sincerely,         [facility]

## 2021-06-19 NOTE — PROGRESS NOTES
Office Visit - Follow Up   Kaushik Lopez   43 y.o. male    Date of Visit: 7/26/2018    Chief Complaint   Patient presents with     Follow-up     needs med refills. Tazed by police and broke front 3 teeth- wants tramadol filled         Assessment and Plan   1. Pain, dental  Complains of dental pain mostly involving the upper front teeth.  Apparently he was tased by the police and fell on his face which broke his 3 front teeth.  Was seen by his dentist and fixed them together and placed a brace to keep them in place.  Now still  complaints of ongoing dental pains.  Taking amoxicillin 3 times a day to prevent infection.  Wants to take something stronger for his dental pain.  Will give him a limited prescription of Vicodin to take 4 times a day as needed #20 without refills  - HYDROcodone-acetaminophen (NORCO )  mg per tablet; Take 1 tablet by mouth every 6 (six) hours as needed for pain.  Dispense: 20 tablet; Refill: 0    2. Dental injury, sequela  Had dental injury after a fall.  Broke his 3 front teeth.  His dentist was able to save them and put them together.  Has a brace in the upper front teeth.    3. Schizoaffective disorder, depressive type (H)  Has schizoaffective disorder.  Takes several psych medications prescribed by psychiatry.  To be followed by psychiatry.  Okay to get refill of risperidone hydroxyzine  - risperiDONE (RISPERDAL) 3 MG tablet; Take 1 tablet (3 mg total) by mouth at bedtime.  Dispense: 30 tablet; Refill: 0  - hydrOXYzine pamoate (VISTARIL) 100 MG capsule; Take 1 capsule (100 mg total) by mouth daily.  Dispense: 90 capsule; Refill: 3      Follow up as needed.     History of Present Illness   This 43 y.o. old male is here for his follow-up.  Fell after he was tazed  by police a few days ago.  Had altercation with the police.  Was tazed when he became belligerent.  Fell on his face.  Broke his 3 front teeth.  His dentist was able to put them together and save these.  Now wears a  brace.  Taking amoxicillin for prophylaxis against infection.  After he broke his teeth had significant dental pain which is still ongoing.  Wants to take something stronger to ease his pains.  Takes tramadol, his usual pain med but this does not help anymore.  Wants to take something stronger.  Has schizoaffective disorder  which flare from time to time and causes changes of his personality.  Followed by psych.  Takes  several anti-psych medications.  Overall, feels well now.    Review of Systems   A 12 point comprehensive review of systems was negative except as noted..     Medications, Allergies and Problem List   Reviewed and updated             Chief Complaint   Follow-up (needs med refills. Tazed by police and broke front 3 teeth- wants tramadol filled )       Patient Profile   Social History     Social History Narrative    Single.  2 children, 15-year-old son and 13-year-old daughter.  On disability.  Smokes half pack of cigarettes per day.  Quit alcohol.  Also reports he stopped using polysubstance like cocaine, marijuana and others. Lives in a safe house.         Past Medical History   Patient Active Problem List   Diagnosis     Severe cannabis use disorder (H)     Bilateral low back pain without sciatica     Thoracic back pain     Lung nodules     Other and unspecified alcohol dependence, unspecified drinking behavior     Substance use disorder     Chronic mental illness     Anxiety     Polysubstance (excluding opioids) dependence (H)     Penile abrasion, initial encounter     Insomnia due to medical condition     Pain of upper abdomen     Personality disorder     Severe alcohol use disorder (H)     Sciatica     Dysuria     Pain, dental     STD exposure     Shoulder pain     Neck muscle spasm     Right lumbar radiculopathy     Agitation     Avascular necrosis of femoral head (H)     Chronic back pain     Suicidal ideation     Schizoaffective disorder, depressive type (H)     Dental injury, sequela      "Antisocial personality disorder in adult       Past Surgical History  He has no past surgical history on file.       Medications and Allergies   Current Outpatient Prescriptions   Medication Sig     cholecalciferol, vitamin D3, 1,000 unit tablet Take 1 tablet (1,000 Units total) by mouth daily.     gabapentin (NEURONTIN) 300 MG capsule Take 3 capsules (900 mg total) by mouth 3 (three) times a day.     hydrOXYzine pamoate (VISTARIL) 100 MG capsule Take 1 capsule (100 mg total) by mouth daily. Take 1 tablet in the AM, and 2 tablets at bedtime     risperiDONE (RISPERDAL) 3 MG tablet Take 1 tablet (3 mg total) by mouth at bedtime.     tiZANidine (ZANAFLEX) 2 MG tablet Take 2 tablets (4 mg total) by mouth every 8 (eight) hours as needed (back pain).     traMADol (ULTRAM) 50 mg tablet Take 1 tablet (50 mg total) by mouth every 6 (six) hours as needed for pain.     chlorproMAZINE (THORAZINE) 25 MG tablet Take 1 tablet (25 mg total) by mouth 3 (three) times a day as needed (agitation anxiety).     HYDROcodone-acetaminophen (NORCO )  mg per tablet Take 1 tablet by mouth every 6 (six) hours as needed for pain.     Allergies   Allergen Reactions     Ibuprofen Nausea And Vomiting     Previously tolerated     Iodinated Contrast- Oral And Iv Dye Nausea And Vomiting     Pt reports a black dye that he drank not sure what kind of dye?     Ioxaglate Sodium Nausea And Vomiting     Latex Nausea And Vomiting     Nsaids (Non-Steroidal Anti-Inflammatory Drug) Nausea And Vomiting        Family and Social History   Family History   Problem Relation Age of Onset     Bipolar disorder Sister      x2     Suicidality Mother         Social History   Substance Use Topics     Smoking status: Smoker, Current Status Unknown     Packs/day: 1.00     Smokeless tobacco: Never Used     Alcohol use Yes      Comment: for the last 2-3 weeks.        Physical Exam       Physical Exam  /60  Pulse 96  Ht 5' 11\" (1.803 m)  Wt 171 lb (77.6 kg) "  SpO2 96%  BMI 23.85 kg/m2  General appearance: alert, appears stated age, cooperative and no distress  Head: Normocephalic, without obvious abnormality, atraumatic  Throat: lips, mucosa, and tongue normal; teeth and gums normal and 3 upper front teeth are broken but are put together with  a brace  Lungs: clear to auscultation bilaterally  Heart: regular rate and rhythm, S1, S2 normal, no murmur, click, rub or gallop  Abdomen: soft, non-tender; bowel sounds normal; no masses,  no organomegaly  Extremities: extremities normal, atraumatic, no cyanosis or edema  Skin: Skin color, texture, turgor normal. No rashes or lesions  Psychiatric: Appropriate mood     Additional Information        Arnaldo Gan MD  Internal Medicine  Contact me at [phone]     Additional Information   Current Outpatient Prescriptions   Medication Sig     cholecalciferol, vitamin D3, 1,000 unit tablet Take 1 tablet (1,000 Units total) by mouth daily.     gabapentin (NEURONTIN) 300 MG capsule Take 3 capsules (900 mg total) by mouth 3 (three) times a day.     hydrOXYzine pamoate (VISTARIL) 100 MG capsule Take 1 capsule (100 mg total) by mouth daily. Take 1 tablet in the AM, and 2 tablets at bedtime     risperiDONE (RISPERDAL) 3 MG tablet Take 1 tablet (3 mg total) by mouth at bedtime.     tiZANidine (ZANAFLEX) 2 MG tablet Take 2 tablets (4 mg total) by mouth every 8 (eight) hours as needed (back pain).     traMADol (ULTRAM) 50 mg tablet Take 1 tablet (50 mg total) by mouth every 6 (six) hours as needed for pain.     chlorproMAZINE (THORAZINE) 25 MG tablet Take 1 tablet (25 mg total) by mouth 3 (three) times a day as needed (agitation anxiety).     HYDROcodone-acetaminophen (NORCO )  mg per tablet Take 1 tablet by mouth every 6 (six) hours as needed for pain.     Allergies   Allergen Reactions     Ibuprofen Nausea And Vomiting     Previously tolerated     Iodinated Contrast- Oral And Iv Dye Nausea And Vomiting     Pt reports a  black dye that he drank not sure what kind of dye?     Ioxaglate Sodium Nausea And Vomiting     Latex Nausea And Vomiting     Nsaids (Non-Steroidal Anti-Inflammatory Drug) Nausea And Vomiting     Social History   Substance Use Topics     Smoking status: Smoker, Current Status Unknown     Packs/day: 1.00     Smokeless tobacco: Never Used     Alcohol use Yes      Comment: for the last 2-3 weeks.         Time: total time spent with the patient was 25 minutes of which >50% was spent in counseling and coordination of care

## 2021-06-22 NOTE — PROGRESS NOTES
Office Visit - Follow Up   Kaushik Lopez   43 y.o. male    Date of Visit: 1/9/2019    Chief Complaint   Patient presents with     Fall     L hand pain and swelling, hit head above L eye brow pt tells me he fell while intoxicated 3 days ago        Assessment and Plan   1. Thumb pain, left  Complains of left thumb pain started 2 days ago after a fall.  Apparently he fall from a 9 step stairway because he was intoxicated and lost his balance going down.  Cannot remember the exact events of his fall.  But after his fall he noted a painful lump on his left eyebrow and experienced significant left thumb pains.  Unable to move his left thumb because of the pains.  Bruising and not on the left eyebrow are resolved.  I did x-ray of the left thumb and personally reviewed this to to have possible avulsion fracture on the distal part of the left thumb.  Will ask radiology to officially read this.  I still think he needs to see orthopedic because of his significant left thumb pains and swelling.  Practically unable to do range of motion of his left thumb.  In the meantime while waiting to see orthopedics will prescribe a limited number and  without refill of hydrocodone with acetaminophen to take  1 tablet every 6 hours as needed.  - XR Finger Left 2 or More VWS; Future  - HYDROcodone-acetaminophen (NORCO )  mg per tablet; Take 1 tablet by mouth every 6 (six) hours as needed for pain.  Dispense: 20 tablet; Refill: 0  - Ambulatory referral to Orthopedics    2. Fall, initial encounter  Fell from an 9 step stairway because he was intoxicated and lost his balance.  Incurred a left eyebrow bruise and left thumb pains.  Being evaluated for his left thumb pains.  Left eyebrow bruise and lumbar  are resolved.  - XR Finger Left 2 or More VWS; Future    3. Schizoaffective disorder, depressive type (H)  Has schizophrenia.  Stable and controlled.  Takes risperidone and chlorpromazine.  Followed by psychiatry.  Wants me to  refill these medications.  - risperiDONE (RISPERDAL) 3 MG tablet; Take 1 tablet (3 mg total) by mouth at bedtime.  Dispense: 30 tablet; Refill: 1  - chlorproMAZINE (THORAZINE) 25 MG tablet; Take 1 tablet (25 mg total) by mouth 3 (three) times a day as needed (agitation anxiety).  Dispense: 30 tablet; Refill: 11    4. Anxiety  Has significant anxiety off and on..  Takes gabapentin and Vistaril.  Both meds control his anxiety.  - hydrOXYzine pamoate (VISTARIL) 100 MG capsule; Take 1 capsule (100 mg total) by mouth daily.  Dispense: 90 capsule; Refill: 3  - gabapentin (NEURONTIN) 300 MG capsule; Take 3 capsules (900 mg total) by mouth 3 (three) times a day.  Dispense: 270 capsule; Refill: 3    Follow up as needed.     History of Present Illness   This 43 y.o. old male complains of left thumb pains and left eyebrow bruise.  Felt 2 days ago from an eye step stairway because he was intoxicated.  Did not remember the actual event of his fall.  But started to have left eyebrow pain and significant left thumb pains.  Left eyebrow pain she is now resolved but still has continuing left thumb pains.  Unable to move his left arm or abducting his left arm with the other fingers.  There are swelling and tenderness of the MCP, DIP and PIP.  Has schizoaffective disorder.  But in remission.  Controlled by  psych medications prescribed by his psychiatrist.  Has anxiety which only comes off and on.  Takes gabapentin and  hydroxyzine.  Overall, stable.    Review of Systems   A 12 point comprehensive review of systems was negative except as noted..     Medications, Allergies and Problem List   Reviewed and updated             Chief Complaint   Fall (L hand pain and swelling, hit head above L eye brow pt tells me he fell while intoxicated 3 days ago)       Patient Profile   Social History     Social History Narrative    Single.  2 children, 15-year-old son and 13-year-old daughter.  On disability.  Smokes half pack of cigarettes per day.   Quit alcohol.  Also reports he stopped using polysubstance like cocaine, marijuana and others. Lives in a safe house.         Past Medical History   Patient Active Problem List   Diagnosis     Severe cannabis use disorder (H)     Bilateral low back pain without sciatica     Thoracic back pain     Lung nodules     Other and unspecified alcohol dependence, unspecified drinking behavior     Substance use disorder     Chronic mental illness     Anxiety     Polysubstance (excluding opioids) dependence (H)     Penile abrasion, initial encounter     Insomnia due to medical condition     Pain of upper abdomen     Personality disorder (H)     Severe alcohol use disorder (H)     Sciatica     Dysuria     Pain, dental     STD exposure     Shoulder pain     Neck muscle spasm     Right lumbar radiculopathy     Agitation     Avascular necrosis of femoral head (H)     Chronic back pain     Suicidal ideation     Schizoaffective disorder, depressive type (H)     Dental injury, sequela     Antisocial personality disorder in adult (H)       Past Surgical History  He has no past surgical history on file.       Medications and Allergies   Current Outpatient Medications   Medication Sig     chlorproMAZINE (THORAZINE) 25 MG tablet Take 1 tablet (25 mg total) by mouth 3 (three) times a day as needed (agitation anxiety).     cholecalciferol, vitamin D3, 1,000 unit tablet Take 1 tablet (1,000 Units total) by mouth daily.     gabapentin (NEURONTIN) 300 MG capsule Take 3 capsules (900 mg total) by mouth 3 (three) times a day.     hydrOXYzine pamoate (VISTARIL) 100 MG capsule Take 1 capsule (100 mg total) by mouth daily.     tiZANidine (ZANAFLEX) 2 MG tablet Take 2 tablets (4 mg total) by mouth every 8 (eight) hours as needed (back pain).     traMADol (ULTRAM) 50 mg tablet Take 1 tablet (50 mg total) by mouth every 6 (six) hours as needed for pain.     HYDROcodone-acetaminophen (NORCO )  mg per tablet Take 1 tablet by mouth every 6  "(six) hours as needed for pain.     risperiDONE (RISPERDAL) 3 MG tablet Take 1 tablet (3 mg total) by mouth at bedtime.     Allergies   Allergen Reactions     Ibuprofen Nausea And Vomiting     Previously tolerated     Iodinated Contrast- Oral And Iv Dye Nausea And Vomiting     Pt reports a black dye that he drank not sure what kind of dye?     Ioxaglate Sodium Nausea And Vomiting     Latex Nausea And Vomiting     Nsaids (Non-Steroidal Anti-Inflammatory Drug) Nausea And Vomiting        Family and Social History   Family History   Problem Relation Age of Onset     Bipolar disorder Sister         x2     Suicidality Mother         Social History     Tobacco Use     Smoking status: Smoker, Current Status Unknown     Packs/day: 1.00     Smokeless tobacco: Never Used   Substance Use Topics     Alcohol use: Yes     Comment: for the last 2-3 weeks.     Drug use: Yes     Frequency: 7.0 times per week     Types: Marijuana        Physical Exam       Physical Exam  /70   Pulse 76   Ht 5' 11\" (1.803 m)   Wt 160 lb (72.6 kg)   SpO2 98%   BMI 22.32 kg/m    General appearance: alert, appears stated age, cooperative and no distress  Head: Normocephalic, without obvious abnormality, atraumatic  Eyes: Left eyebrow inner canthus has a lump which is nontender and resolving  Throat: lips, mucosa, and tongue normal; teeth and gums normal  Neck: no adenopathy, no carotid bruit, no JVD, supple, symmetrical, trachea midline and thyroid not enlarged, symmetric, no tenderness/mass/nodules  Lungs: clear to auscultation bilaterally  Heart: regular rate and rhythm, S1, S2 normal, no murmur, click, rub or gallop  Abdomen: soft, non-tender; bowel sounds normal; no masses,  no organomegaly  Extremities: extremities normal, atraumatic, no cyanosis or edema  Skin: Skin color, texture, turgor normal. No rashes or lesions  Neurologic: Grossly normal  Musculoskeletal: Left swollen, tender, particularly around the MCP and PIP and practically no " range of motion because of pain     Additional Information        Arnaldo Gan MD  Internal Medicine  Contact me at [phone]     Additional Information   Current Outpatient Medications   Medication Sig     chlorproMAZINE (THORAZINE) 25 MG tablet Take 1 tablet (25 mg total) by mouth 3 (three) times a day as needed (agitation anxiety).     cholecalciferol, vitamin D3, 1,000 unit tablet Take 1 tablet (1,000 Units total) by mouth daily.     gabapentin (NEURONTIN) 300 MG capsule Take 3 capsules (900 mg total) by mouth 3 (three) times a day.     hydrOXYzine pamoate (VISTARIL) 100 MG capsule Take 1 capsule (100 mg total) by mouth daily.     tiZANidine (ZANAFLEX) 2 MG tablet Take 2 tablets (4 mg total) by mouth every 8 (eight) hours as needed (back pain).     traMADol (ULTRAM) 50 mg tablet Take 1 tablet (50 mg total) by mouth every 6 (six) hours as needed for pain.     HYDROcodone-acetaminophen (NORCO )  mg per tablet Take 1 tablet by mouth every 6 (six) hours as needed for pain.     risperiDONE (RISPERDAL) 3 MG tablet Take 1 tablet (3 mg total) by mouth at bedtime.     Allergies   Allergen Reactions     Ibuprofen Nausea And Vomiting     Previously tolerated     Iodinated Contrast- Oral And Iv Dye Nausea And Vomiting     Pt reports a black dye that he drank not sure what kind of dye?     Ioxaglate Sodium Nausea And Vomiting     Latex Nausea And Vomiting     Nsaids (Non-Steroidal Anti-Inflammatory Drug) Nausea And Vomiting     Social History     Tobacco Use     Smoking status: Smoker, Current Status Unknown     Packs/day: 1.00     Smokeless tobacco: Never Used   Substance Use Topics     Alcohol use: Yes     Comment: for the last 2-3 weeks.     Drug use: Yes     Frequency: 7.0 times per week     Types: Marijuana         Time: total time spent with the patient was 25 minutes of which >50% was spent in counseling and coordination of care

## 2021-06-23 NOTE — PROGRESS NOTES
"OFFICE VISIT NOTE    Subjective:   Chief Complaint:  Follow-up (back, neck and thumb pain is worse since last OV with Dr. Gan. was unable to get to Ortho due to ride complications)    43-year-old man who is in with complaints of persistent pain in the left thumb and wrist.  He fell down the steps a couple of weeks ago when he was intoxicated.  The left hand and thumb were injured.  X-rays were negative for any fracture.  He said the days that he still has a lot of pain.  He missed his orthopedic appointment.    Current Outpatient Medications   Medication Sig     chlorproMAZINE (THORAZINE) 25 MG tablet Take 1 tablet (25 mg total) by mouth 3 (three) times a day as needed (agitation anxiety).     cholecalciferol, vitamin D3, 1,000 unit tablet Take 1 tablet (1,000 Units total) by mouth daily.     gabapentin (NEURONTIN) 300 MG capsule Take 3 capsules (900 mg total) by mouth 3 (three) times a day.     HYDROcodone-acetaminophen (NORCO )  mg per tablet Take 1 tablet by mouth every 6 (six) hours as needed for pain.     hydrOXYzine pamoate (VISTARIL) 100 MG capsule Take 1 capsule (100 mg total) by mouth daily.     risperiDONE (RISPERDAL) 3 MG tablet Take 1 tablet (3 mg total) by mouth at bedtime.     tiZANidine (ZANAFLEX) 2 MG tablet Take 2 tablets (4 mg total) by mouth every 8 (eight) hours as needed (back pain).     traMADol (ULTRAM) 50 mg tablet Take 1 tablet (50 mg total) by mouth every 6 (six) hours as needed for pain.       Review of Systems:  A comprehensive review of systems is negative except for the comments above    Objective:    Ht 5' 11\" (1.803 m)   Wt 156 lb (70.8 kg)   BMI 21.76 kg/m    GENERAL: No acute distress.  On exam he does have swelling of the left thenar eminence.  He also has discomfort when he flexes the thumb at the metatarsal phalangeal joint.  Circulation and sensation seem intact.  The wrist is normal.  Seems to have good range of motion to the neck.  Shoulder seems " normal.    Assessment & Plan   Kaushik Lopez is a 43 y.o. male.     pain at the base of the left thumb after a fall about 2 weeks ago.  I did supply him with a wrist and thumb splint.  It is a Velcro type.  He should use Tylenol as well as gabapentin 3 times daily for pain.  Apply ice twice daily for 20 minutes.  He needs to follow-up with orthopedist regarding this thumb injury.    Diagnoses and all orders for this visit:    Thumb pain, left        Charly Gilliam MD  Transcription using voice recognition software, may contain typographical errors.

## 2021-06-25 NOTE — TELEPHONE ENCOUNTER
Pt was seen on 3/18/19 at [facility] on 3/18/19 with Dr. Vladimir Hendrix. Dictation for the visit has not been sent yet.     Patient is also MA restricted and has to be directed to [facility] for care. Please place order for pain clinic you are ok with this course of care.

## 2021-06-25 NOTE — TELEPHONE ENCOUNTER
Referral Request  Type of referral: pain clinic  Who s requesting: Patient  Why the request: for left wrist and low back pain  Have you been seen for this request: Yes  Does patient have a preference on a group/provider? none  Okay to leave a detailed message?  Yes

## 2021-06-25 NOTE — TELEPHONE ENCOUNTER
Referral to pain clinic has been set up in a separate encounter for Dr. Gan to review per the message below.    Spoke with the patient and let him know that Dr. Gan has placed the referral to the pain clinic as requested.  Patient verbalized understanding and had no further questions at this time.  Sabine KHOURY CMA/RHONA....................11:24 AM

## 2021-06-25 NOTE — PROGRESS NOTES
Office Visit - Follow Up   Kaushik Lopez   43 y.o. male    Date of Visit: 3/15/2019    Chief Complaint   Patient presents with     Follow-up     lwft wrist pain        Assessment and Plan   1. Pain of finger and wrist of left hand  Pains of persisting pains on his left thumb and left wrist.  Apparently fell 1/2 weeks ago when he slid on ice and supporting himself with his extended left wrist.  Went to [facility] ER and was worked up.  X-ray of the left wrist and left hand was negative for fracture.  Was advised he bruised his left thumb and left wrist.  Was provided with hand and wrist splint which he is wearing now.  Advised he needs to be seen by orthopedics for this because of his continuing pains.  Advised no need to do another x-ray because x-ray was already done at the emergency room.  Okay to continue with gabapentin.  Restart hydrocodone with abdominal pain but will  refill only for restricted number of 20 pills.  - Ambulatory referral to Orthopedic Surgery  - gabapentin (NEURONTIN) 300 MG capsule; Take 3 capsules (900 mg total) by mouth 3 (three) times a day.  Dispense: 270 capsule; Refill: 3  - HYDROcodone-acetaminophen (NORCO )  mg per tablet; Take 1 tablet by mouth every 6 (six) hours as needed for pain.  Dispense: 20 tablet; Refill: 0    2. Personality disorder (H)   Personality and mood disorder.  Followed by psychiatry.  Takes a few psych medication.  Continue to follow with psychiatry.    3. Vitamin D deficiency  Takes vitamin D for his vitamin D deficiency.  Wants refill of his vitamin D 3.  - cholecalciferol, vitamin D3, 1,000 unit tablet; Take 1 tablet (1,000 Units total) by mouth daily.  Dispense: 100 tablet; Refill: 3    4. Insomnia due to medical condition  Does not sleep well because of his medical conditions specifically now he is having left hand and wrist pains.  Okay to resume trazodone at bedtime.  - traZODone (DESYREL) 50 MG tablet; Take 1 tablet (50 mg  total) by mouth at bedtime.  Dispense: 30 tablet; Refill: 0      Follow up in 1 month.     History of Present Illness   This 43 y.o. old male complains of continuing and persisting left hand pain specifically involving the left thumb and left wrist.  Fell apparently 1 and 1/2 weeks ago when he slipped on the ice and tried to break his fall with his extended left wrist and hand.  Incurred significant pains for which she went to the emergency room at [facility] on 3/5/2019.  Was worked up with negative x-ray for fracture of the left hand.  Was given Toradol injection one time.  Was advised he has a contusion/bruise of his left hand and left wrist.  Was placed on a wrist and hand splint.  Comes to see me today still complaining of left hand pains.  Has personality disorder including mood disorder.  Followed by psychiatry. Unable to sleep well because of his pains.  Wants to take something for this.  Overall, stable.    Review of Systems   A 12 point comprehensive review of systems was negative except as noted..     Medications, Allergies and Problem List   Reviewed and updated             Chief Complaint   Follow-up (lwft wrist pain)       Patient Profile   Social History     Social History Narrative    Single.  2 children, 15-year-old son and 13-year-old daughter.  On disability.  Smokes half pack of cigarettes per day.  Quit alcohol.  Also reports he stopped using polysubstance like cocaine, marijuana and others. Lives in a safe house.         Past Medical History   Patient Active Problem List   Diagnosis     Severe cannabis use disorder (H)     Bilateral low back pain without sciatica     Thoracic back pain     Lung nodules     Other and unspecified alcohol dependence, unspecified drinking behavior     Substance use disorder     Chronic mental illness     Anxiety     Polysubstance (excluding opioids) dependence (H)     Penile abrasion, initial encounter     Insomnia due to medical condition      Pain of upper abdomen     Personality disorder (H)     Severe alcohol use disorder (H)     Sciatica     Dysuria     Pain, dental     STD exposure     Shoulder pain     Neck muscle spasm     Right lumbar radiculopathy     Agitation     Avascular necrosis of femoral head (H)     Chronic back pain     Suicidal ideation     Schizoaffective disorder, depressive type (H)     Dental injury, sequela     Antisocial personality disorder in adult (H)       Past Surgical History  He has no past surgical history on file.       Medications and Allergies   Current Outpatient Medications   Medication Sig     chlorproMAZINE (THORAZINE) 25 MG tablet Take 1 tablet (25 mg total) by mouth 3 (three) times a day as needed (agitation anxiety).     cholecalciferol, vitamin D3, 1,000 unit tablet Take 1 tablet (1,000 Units total) by mouth daily.     gabapentin (NEURONTIN) 300 MG capsule Take 3 capsules (900 mg total) by mouth 3 (three) times a day.     HYDROcodone-acetaminophen (NORCO )  mg per tablet Take 1 tablet by mouth every 6 (six) hours as needed for pain.     hydrOXYzine pamoate (VISTARIL) 100 MG capsule Take 1 capsule (100 mg total) by mouth daily.     risperiDONE (RISPERDAL) 3 MG tablet Take 1 tablet (3 mg total) by mouth at bedtime.     tiZANidine (ZANAFLEX) 2 MG tablet Take 2 tablets (4 mg total) by mouth every 8 (eight) hours as needed (back pain).     traMADol (ULTRAM) 50 mg tablet Take 1 tablet (50 mg total) by mouth every 6 (six) hours as needed for pain.     traZODone (DESYREL) 50 MG tablet Take 1 tablet (50 mg total) by mouth at bedtime.     Allergies   Allergen Reactions     Ibuprofen Nausea And Vomiting     Previously tolerated     Iodinated Contrast- Oral And Iv Dye Nausea And Vomiting     Pt reports a black dye that he drank not sure what kind of dye?     Ioxaglate Sodium Nausea And Vomiting     Latex Nausea And Vomiting     Nsaids (Non-Steroidal Anti-Inflammatory Drug) Nausea And Vomiting        Family and  "Social History   Family History   Problem Relation Age of Onset     Bipolar disorder Sister         x2     Suicidality Mother         Social History     Tobacco Use     Smoking status: Smoker, Current Status Unknown     Packs/day: 1.00     Smokeless tobacco: Never Used   Substance Use Topics     Alcohol use: Yes     Comment: for the last 2-3 weeks.     Drug use: Yes     Frequency: 7.0 times per week     Types: Marijuana        Physical Exam       Physical Exam  BP 98/52   Pulse 80   Ht 5' 11\" (1.803 m)   Wt 158 lb (71.7 kg)   SpO2 98%   BMI 22.04 kg/m    General appearance: alert, appears stated age, cooperative and in pain especially touching his left hand and left wrist.  Head: Normocephalic, without obvious abnormality, atraumatic  Throat: lips, mucosa, and tongue normal; teeth and gums normal  Neck: no adenopathy, no carotid bruit, no JVD, supple, symmetrical, trachea midline and thyroid not enlarged, symmetric, no tenderness/mass/nodules  Lungs: clear to auscultation bilaterally  Heart: regular rate and rhythm, S1, S2 normal, no murmur, click, rub or gallop  Abdomen: soft, non-tender; bowel sounds normal; no masses,  no organomegaly  Extremities: extremities normal, atraumatic, no cyanosis or edema  Skin: Skin color, texture, turgor normal. No rashes or lesions  Musculoskeletal: Left wrist tender to touch, left thumb is mildly swollen and tender     Additional Information        Arnaldo Gan MD  Internal Medicine  Contact me at [phone]     Additional Information   Current Outpatient Medications   Medication Sig     chlorproMAZINE (THORAZINE) 25 MG tablet Take 1 tablet (25 mg total) by mouth 3 (three) times a day as needed (agitation anxiety).     cholecalciferol, vitamin D3, 1,000 unit tablet Take 1 tablet (1,000 Units total) by mouth daily.     gabapentin (NEURONTIN) 300 MG capsule Take 3 capsules (900 mg total) by mouth 3 (three) times a day.     HYDROcodone-acetaminophen (NORCO )  " mg per tablet Take 1 tablet by mouth every 6 (six) hours as needed for pain.     hydrOXYzine pamoate (VISTARIL) 100 MG capsule Take 1 capsule (100 mg total) by mouth daily.     risperiDONE (RISPERDAL) 3 MG tablet Take 1 tablet (3 mg total) by mouth at bedtime.     tiZANidine (ZANAFLEX) 2 MG tablet Take 2 tablets (4 mg total) by mouth every 8 (eight) hours as needed (back pain).     traMADol (ULTRAM) 50 mg tablet Take 1 tablet (50 mg total) by mouth every 6 (six) hours as needed for pain.     traZODone (DESYREL) 50 MG tablet Take 1 tablet (50 mg total) by mouth at bedtime.     Allergies   Allergen Reactions     Ibuprofen Nausea And Vomiting     Previously tolerated     Iodinated Contrast- Oral And Iv Dye Nausea And Vomiting     Pt reports a black dye that he drank not sure what kind of dye?     Ioxaglate Sodium Nausea And Vomiting     Latex Nausea And Vomiting     Nsaids (Non-Steroidal Anti-Inflammatory Drug) Nausea And Vomiting     Social History     Tobacco Use     Smoking status: Smoker, Current Status Unknown     Packs/day: 1.00     Smokeless tobacco: Never Used   Substance Use Topics     Alcohol use: Yes     Comment: for the last 2-3 weeks.     Drug use: Yes     Frequency: 7.0 times per week     Types: Marijuana         Time: total time spent with the patient was 25 minutes of which >50% was spent in counseling and coordination of care

## 2021-07-03 NOTE — ADDENDUM NOTE
Addendum Note by Sudheer Monaco CMA at 1/29/2019  3:00 PM     Author: Sudheer Monaco CMA Service: -- Author Type: Certified Medical Assistant    Filed: 1/30/2019  8:20 AM Encounter Date: 1/29/2019 Status: Signed    : Sudheer Monaco CMA (Certified Medical Assistant)    Addended by: SUDHEER MONACO on: 1/30/2019 08:20 AM        Modules accepted: Orders

## 2021-08-24 ENCOUNTER — TRANSFERRED RECORDS (OUTPATIENT)
Dept: HEALTH INFORMATION MANAGEMENT | Facility: CLINIC | Age: 46
End: 2021-08-24

## 2023-07-31 ENCOUNTER — TELEPHONE (OUTPATIENT)
Dept: BEHAVIORAL HEALTH | Facility: CLINIC | Age: 48
End: 2023-07-31

## 2023-07-31 NOTE — TELEPHONE ENCOUNTER
[address] [address]                               PULMONARY FUNCTION    PATIENT NAME: Keith Ravi                      :        1994  MED REC NO:   [id number]                            ROOM:  ACCOUNT NO:   [de-identified]                           ADMIT DATE: 2022  PROVIDER:     Elle Li MD    DATE OF PROCEDURE:  2022    INTERPRETATION:  This is a good patient effort and cooperation. FEV1/FVC ratio is 73% predicted. FEV1 is 3.77 liters, 98% predicted. Post-bronchodilator FEV1 is 3.82 liters, 99% predicted, 1% change. Forced vital capacity is 5.14 liters, 112% predicted. Post  bronchodilator, forced vital capacity is 4.97 liters, 108% predicted,  negative 3% change. Mid flows are 70% predicted. Forced expiratory  time is 8.32 seconds. The flow volume loop did not demonstrate any  intra or extrathoracic obstruction. IMPRESSION:  1. Spirometry reveals no airflow obstruction. 2.  There is no bronchodilator response.         Kay Dejesus MD    D: 10/13/2022 9:15:50       T: 10/13/2022 10:33:59     [email or web address]  Job#: [id number]     Doc#: [id number]    CC: Reason for call:  Other   Patient called regarding (reason for call): call back  Additional comments: Patient called que requesting [facility] med management appointment    Phone number to reach patient:  Cell number on file:    Telephone Information:   Mobile [phone]       Best Time:  today    Can we leave a detailed message on this number?  YES    Travel screening: Negative    Additional Progress Note...

## 2023-07-31 NOTE — TELEPHONE ENCOUNTER
[facility] RN returned call to Kaushik. Kaushik informing RN that he has a referral for medication management from Dr.. Winn. RN reviewed chart and fax and noted no referral received by [facility]. RN informed Kaushik that no referral received at [facility].    Kaushik agreed to have Dr. Winn office refax referral to [facility] for medication management. Kaushik stated he would call back [facility] to update clinic regarding referral.